# Patient Record
Sex: FEMALE | Race: WHITE | Employment: OTHER | URBAN - METROPOLITAN AREA
[De-identification: names, ages, dates, MRNs, and addresses within clinical notes are randomized per-mention and may not be internally consistent; named-entity substitution may affect disease eponyms.]

---

## 2021-11-05 ENCOUNTER — APPOINTMENT (OUTPATIENT)
Dept: GENERAL RADIOLOGY | Age: 71
DRG: 065 | End: 2021-11-05
Attending: EMERGENCY MEDICINE
Payer: MEDICARE

## 2021-11-05 ENCOUNTER — APPOINTMENT (OUTPATIENT)
Dept: CT IMAGING | Age: 71
DRG: 065 | End: 2021-11-05
Attending: EMERGENCY MEDICINE
Payer: MEDICARE

## 2021-11-05 ENCOUNTER — HOSPITAL ENCOUNTER (INPATIENT)
Age: 71
LOS: 3 days | Discharge: HOSPICE | DRG: 065 | End: 2021-11-08
Attending: EMERGENCY MEDICINE | Admitting: HOSPITALIST
Payer: MEDICARE

## 2021-11-05 DIAGNOSIS — N30.01 ACUTE CYSTITIS WITH HEMATURIA: ICD-10-CM

## 2021-11-05 DIAGNOSIS — I63.9 ACUTE STROKE DUE TO ISCHEMIA (HCC): ICD-10-CM

## 2021-11-05 DIAGNOSIS — A41.9 SEVERE SEPSIS WITH ACUTE ORGAN DYSFUNCTION (HCC): Primary | ICD-10-CM

## 2021-11-05 DIAGNOSIS — R65.20 SEVERE SEPSIS WITH ACUTE ORGAN DYSFUNCTION (HCC): Primary | ICD-10-CM

## 2021-11-05 DIAGNOSIS — N17.9 AKI (ACUTE KIDNEY INJURY) (HCC): ICD-10-CM

## 2021-11-05 PROBLEM — N39.0 UTI (URINARY TRACT INFECTION): Status: ACTIVE | Noted: 2021-11-05

## 2021-11-05 PROBLEM — R77.8 ELEVATED TROPONIN: Status: ACTIVE | Noted: 2021-11-05

## 2021-11-05 PROBLEM — E11.9 TYPE 2 DIABETES MELLITUS (HCC): Status: ACTIVE | Noted: 2021-11-05

## 2021-11-05 PROBLEM — E11.65 HYPERGLYCEMIA DUE TO DIABETES MELLITUS (HCC): Status: ACTIVE | Noted: 2021-11-05

## 2021-11-05 PROBLEM — E87.1 HYPONATREMIA: Status: ACTIVE | Noted: 2021-11-05

## 2021-11-05 LAB
ALBUMIN SERPL-MCNC: 1.9 G/DL (ref 3.4–5)
ALBUMIN/GLOB SERPL: 0.4 {RATIO} (ref 0.8–1.7)
ALP SERPL-CCNC: 158 U/L (ref 45–117)
ALT SERPL-CCNC: 142 U/L (ref 13–56)
ANION GAP BLD CALC-SCNC: 13 MMOL/L (ref 10–20)
ANION GAP SERPL CALC-SCNC: 8 MMOL/L (ref 3–18)
APPEARANCE UR: ABNORMAL
APTT PPP: 32 SEC (ref 23–36.4)
AST SERPL-CCNC: 75 U/L (ref 10–38)
BACTERIA URNS QL MICRO: ABNORMAL /HPF
BASOPHILS # BLD: 0 K/UL (ref 0–0.1)
BASOPHILS NFR BLD: 0 % (ref 0–2)
BILIRUB SERPL-MCNC: 0.3 MG/DL (ref 0.2–1)
BILIRUB UR QL: NEGATIVE
BNP SERPL-MCNC: 8655 PG/ML (ref 0–900)
BUN SERPL-MCNC: 94 MG/DL (ref 7–18)
BUN/CREAT SERPL: 30 (ref 12–20)
CA-I BLD-MCNC: 1.13 MMOL/L (ref 1.12–1.32)
CALCIUM SERPL-MCNC: 8.6 MG/DL (ref 8.5–10.1)
CHLORIDE BLD-SCNC: 102 MMOL/L (ref 98–107)
CHLORIDE SERPL-SCNC: 101 MMOL/L (ref 100–111)
CK MB CFR SERPL CALC: 1 % (ref 0–4)
CK MB SERPL-MCNC: 8.5 NG/ML (ref 5–25)
CK SERPL-CCNC: 823 U/L (ref 26–192)
CO2 BLD-SCNC: 18 MMOL/L (ref 21–32)
CO2 SERPL-SCNC: 21 MMOL/L (ref 21–32)
COLOR UR: ABNORMAL
CREAT BLD-MCNC: 2.32 MG/DL (ref 0.6–1.3)
CREAT SERPL-MCNC: 3.14 MG/DL (ref 0.6–1.3)
DIFFERENTIAL METHOD BLD: ABNORMAL
EOSINOPHIL # BLD: 0 K/UL (ref 0–0.4)
EOSINOPHIL NFR BLD: 0 % (ref 0–5)
EPITH CASTS URNS QL MICRO: ABNORMAL /LPF (ref 0–5)
ERYTHROCYTE [DISTWIDTH] IN BLOOD BY AUTOMATED COUNT: 13.5 % (ref 11.6–14.5)
GLOBULIN SER CALC-MCNC: 4.8 G/DL (ref 2–4)
GLUCOSE BLD-MCNC: 583 MG/DL (ref 65–100)
GLUCOSE SERPL-MCNC: 578 MG/DL (ref 74–99)
GLUCOSE UR STRIP.AUTO-MCNC: NEGATIVE MG/DL
HCT VFR BLD AUTO: 31.4 % (ref 35–45)
HGB BLD-MCNC: 10.2 G/DL (ref 12–16)
HGB UR QL STRIP: ABNORMAL
INR PPP: 1.3 (ref 0.8–1.2)
KETONES UR QL STRIP.AUTO: ABNORMAL MG/DL
LACTATE BLD-SCNC: 2.93 MMOL/L (ref 0.4–2)
LACTATE BLD-SCNC: 3.3 MMOL/L (ref 0.4–2)
LEUKOCYTE ESTERASE UR QL STRIP.AUTO: ABNORMAL
LYMPHOCYTES # BLD: 1.1 K/UL (ref 0.9–3.6)
LYMPHOCYTES NFR BLD: 7 % (ref 21–52)
MAGNESIUM SERPL-MCNC: 2.4 MG/DL (ref 1.6–2.6)
MCH RBC QN AUTO: 31.2 PG (ref 24–34)
MCHC RBC AUTO-ENTMCNC: 32.5 G/DL (ref 31–37)
MCV RBC AUTO: 96 FL (ref 78–100)
MONOCYTES # BLD: 1.3 K/UL (ref 0.05–1.2)
MONOCYTES NFR BLD: 8 % (ref 3–10)
NEUTS SEG # BLD: 13.4 K/UL (ref 1.8–8)
NEUTS SEG NFR BLD: 84 % (ref 40–73)
NITRITE UR QL STRIP.AUTO: NEGATIVE
PH UR STRIP: 5 [PH] (ref 5–8)
PHOSPHATE SERPL-MCNC: 4.6 MG/DL (ref 2.5–4.9)
PLATELET # BLD AUTO: 244 K/UL (ref 135–420)
PMV BLD AUTO: 12.1 FL (ref 9.2–11.8)
POTASSIUM BLD-SCNC: 5.1 MMOL/L (ref 3.5–5.1)
POTASSIUM SERPL-SCNC: 5 MMOL/L (ref 3.5–5.5)
PROT SERPL-MCNC: 6.7 G/DL (ref 6.4–8.2)
PROT UR STRIP-MCNC: 100 MG/DL
PROTHROMBIN TIME: 15.5 SEC (ref 11.5–15.2)
RBC # BLD AUTO: 3.27 M/UL (ref 4.2–5.3)
RBC #/AREA URNS HPF: ABNORMAL /HPF (ref 0–5)
SERVICE CMNT-IMP: ABNORMAL
SODIUM BLD-SCNC: 132 MMOL/L (ref 136–145)
SODIUM SERPL-SCNC: 130 MMOL/L (ref 136–145)
SP GR UR REFRACTOMETRY: 1.02 (ref 1–1.03)
SPECIMEN SITE: ABNORMAL
TROPONIN I SERPL-MCNC: 0.25 NG/ML (ref 0–0.04)
UROBILINOGEN UR QL STRIP.AUTO: 1 EU/DL (ref 0.2–1)
WBC # BLD AUTO: 15.9 K/UL (ref 4.6–13.2)
WBC URNS QL MICRO: ABNORMAL /HPF (ref 0–5)

## 2021-11-05 PROCEDURE — 80047 BASIC METABLC PNL IONIZED CA: CPT

## 2021-11-05 PROCEDURE — 83880 ASSAY OF NATRIURETIC PEPTIDE: CPT

## 2021-11-05 PROCEDURE — 99285 EMERGENCY DEPT VISIT HI MDM: CPT

## 2021-11-05 PROCEDURE — 82553 CREATINE MB FRACTION: CPT

## 2021-11-05 PROCEDURE — 74011000250 HC RX REV CODE- 250: Performed by: EMERGENCY MEDICINE

## 2021-11-05 PROCEDURE — 93005 ELECTROCARDIOGRAM TRACING: CPT

## 2021-11-05 PROCEDURE — 74176 CT ABD & PELVIS W/O CONTRAST: CPT

## 2021-11-05 PROCEDURE — 87040 BLOOD CULTURE FOR BACTERIA: CPT

## 2021-11-05 PROCEDURE — 84100 ASSAY OF PHOSPHORUS: CPT

## 2021-11-05 PROCEDURE — 83735 ASSAY OF MAGNESIUM: CPT

## 2021-11-05 PROCEDURE — 65660000000 HC RM CCU STEPDOWN

## 2021-11-05 PROCEDURE — 96361 HYDRATE IV INFUSION ADD-ON: CPT

## 2021-11-05 PROCEDURE — 74011250636 HC RX REV CODE- 250/636: Performed by: EMERGENCY MEDICINE

## 2021-11-05 PROCEDURE — 83605 ASSAY OF LACTIC ACID: CPT

## 2021-11-05 PROCEDURE — 85610 PROTHROMBIN TIME: CPT

## 2021-11-05 PROCEDURE — 85025 COMPLETE CBC W/AUTO DIFF WBC: CPT

## 2021-11-05 PROCEDURE — 71045 X-RAY EXAM CHEST 1 VIEW: CPT

## 2021-11-05 PROCEDURE — 94762 N-INVAS EAR/PLS OXIMTRY CONT: CPT

## 2021-11-05 PROCEDURE — 70450 CT HEAD/BRAIN W/O DYE: CPT

## 2021-11-05 PROCEDURE — 74011250636 HC RX REV CODE- 250/636: Performed by: HOSPITALIST

## 2021-11-05 PROCEDURE — 74011636637 HC RX REV CODE- 636/637: Performed by: HOSPITALIST

## 2021-11-05 PROCEDURE — 81001 URINALYSIS AUTO W/SCOPE: CPT

## 2021-11-05 PROCEDURE — 80053 COMPREHEN METABOLIC PANEL: CPT

## 2021-11-05 PROCEDURE — 87086 URINE CULTURE/COLONY COUNT: CPT

## 2021-11-05 PROCEDURE — 96374 THER/PROPH/DIAG INJ IV PUSH: CPT

## 2021-11-05 PROCEDURE — 85730 THROMBOPLASTIN TIME PARTIAL: CPT

## 2021-11-05 RX ORDER — ASPIRIN 81 MG/1
81 TABLET ORAL DAILY
Status: DISCONTINUED | OUTPATIENT
Start: 2021-11-06 | End: 2021-11-07

## 2021-11-05 RX ORDER — DEXTROSE 50 % IN WATER (D50W) INTRAVENOUS SYRINGE
25-50 AS NEEDED
Status: DISCONTINUED | OUTPATIENT
Start: 2021-11-05 | End: 2021-11-08 | Stop reason: HOSPADM

## 2021-11-05 RX ORDER — INSULIN LISPRO 100 [IU]/ML
INJECTION, SOLUTION INTRAVENOUS; SUBCUTANEOUS EVERY 6 HOURS
Status: DISCONTINUED | OUTPATIENT
Start: 2021-11-06 | End: 2021-11-08 | Stop reason: HOSPADM

## 2021-11-05 RX ORDER — SODIUM CHLORIDE 9 MG/ML
100 INJECTION, SOLUTION INTRAVENOUS CONTINUOUS
Status: DISCONTINUED | OUTPATIENT
Start: 2021-11-05 | End: 2021-11-08 | Stop reason: HOSPADM

## 2021-11-05 RX ORDER — INSULIN LISPRO 100 [IU]/ML
INJECTION, SOLUTION INTRAVENOUS; SUBCUTANEOUS
Status: DISCONTINUED | OUTPATIENT
Start: 2021-11-05 | End: 2021-11-05

## 2021-11-05 RX ORDER — INSULIN GLARGINE 100 [IU]/ML
0.3 INJECTION, SOLUTION SUBCUTANEOUS
Status: DISCONTINUED | OUTPATIENT
Start: 2021-11-05 | End: 2021-11-08 | Stop reason: HOSPADM

## 2021-11-05 RX ORDER — SODIUM CHLORIDE 0.9 % (FLUSH) 0.9 %
5-10 SYRINGE (ML) INJECTION AS NEEDED
Status: DISCONTINUED | OUTPATIENT
Start: 2021-11-05 | End: 2021-11-08 | Stop reason: HOSPADM

## 2021-11-05 RX ORDER — MAGNESIUM SULFATE 100 %
4 CRYSTALS MISCELLANEOUS AS NEEDED
Status: DISCONTINUED | OUTPATIENT
Start: 2021-11-05 | End: 2021-11-08 | Stop reason: HOSPADM

## 2021-11-05 RX ORDER — DEXTROSE 50 % IN WATER (D50W) INTRAVENOUS SYRINGE
50 AS NEEDED
Status: DISCONTINUED | OUTPATIENT
Start: 2021-11-05 | End: 2021-11-08 | Stop reason: SDUPTHER

## 2021-11-05 RX ORDER — MAGNESIUM SULFATE 100 %
16 CRYSTALS MISCELLANEOUS AS NEEDED
Status: DISCONTINUED | OUTPATIENT
Start: 2021-11-05 | End: 2021-11-08 | Stop reason: SDUPTHER

## 2021-11-05 RX ORDER — ATORVASTATIN CALCIUM 20 MG/1
20 TABLET, FILM COATED ORAL DAILY
Status: DISCONTINUED | OUTPATIENT
Start: 2021-11-06 | End: 2021-11-07

## 2021-11-05 RX ADMIN — SODIUM CHLORIDE 1000 ML: 900 INJECTION, SOLUTION INTRAVENOUS at 12:59

## 2021-11-05 RX ADMIN — SODIUM CHLORIDE 974 ML: 900 INJECTION, SOLUTION INTRAVENOUS at 13:00

## 2021-11-05 RX ADMIN — WATER 1 G: 1 INJECTION INTRAMUSCULAR; INTRAVENOUS; SUBCUTANEOUS at 13:20

## 2021-11-05 RX ADMIN — SODIUM CHLORIDE 100 ML/HR: 900 INJECTION, SOLUTION INTRAVENOUS at 21:19

## 2021-11-05 RX ADMIN — INSULIN GLARGINE 20 UNITS: 100 INJECTION, SOLUTION SUBCUTANEOUS at 21:17

## 2021-11-05 NOTE — PROGRESS NOTES
1618 Report from [address] TRANSFER - IN REPORT:  Verbal report received from [place] RN (name) on Maria Elena Palm  being received from ED (unit) for routine progression of care    Report consisted of patients Situation, Background, Assessment and   Recommendations(SBAR). Information from the following report(s) SBAR, Kardex, Intake/Output, MAR and Recent Results was reviewed with the receiving nurse. Opportunity for questions and clarification was provided. Assessment completed upon patients arrival to unit and care assumed. 1930 Bedside and Verbal shift change report given to Zoe Ho RN (oncoming nurse) by Tg Quinones. Yakelin Hargrove RN (offgoing nurse). Report included the following information SBAR, Kardex, Intake/Output, and MAR. Pt in bed, call light in reach.

## 2021-11-05 NOTE — ROUTINE PROCESS
[facility] Medic Code Sepsis  -   - Time of RRT: N/A  - Time of Code Sepsis: 4725  - Team:  Physician DR Manuel Bauman  Bedside Nurse SOLO JASMINE/SOLO BETH  Medic RRTM VICTORIANO  Supervisor RN PATTI Sydell Bloch  - SIRS # 1 [id number]  SIRS # 2 WBC-15.9  - Possible source of infection: UTI  - Organ dysfunction related to sepsis: LA > 2  - Labs:  (pull in Chem 7 and CBC)  - Initial Vitals: (pull in from EPIC)  - Blood Cultures collected times 2 OR collected within last 24 hours:  1ST SET @ 1241, 2ND SET @ 1257  - Lactic Acid Collection time OR within last 6 hours: 1237  - Antibiotic Start time: 1320  - Lactic Acid Result: 2.93  - Target Fluid Bolus Amount: 1,972 ml  - Time of Fluid Bolus initiated: 1259  - Reason for no target fluid bolus: N/A  - New PIV / or line: YES    -  Time of Fluid Bolus Completion: 1619  - Cardiopulmonary response after fluid completion: STABLE  - Time of Repeat Lactic Acid: 1435  - Repeat Lactic Acid Result: 3.30  - Repeat Vitals: (pull in from EPIC)  - Vasopressors Needed? NEGATIVE  - Debriefed with RN YES, NOTHING NEEDED    - Additional Notes:  - Transfer to higher level of care?  YES, PATIENT ADMITTED TO ROOM 359

## 2021-11-05 NOTE — H&P
History & Physical    Patient: Maria Elena Palm MRN: [id number]  CSN: [id number]    YOB: 1950  Age: 80 y.o. Sex: female      DOA: 11/5/2021  Primary Care Provider:  Lotus, MD Heidy      Assessment/Plan     Patient Active Problem List   Diagnosis Code    Stroke [facility]) I63.9    Type 2 diabetes mellitus ([facility] [address] E11.9    Hyperglycemia due to diabetes mellitus ([facility] [address] E11.65    YUNIOR (acute kidney injury) ([facility] [address] N17.9    UTI (urinary tract infection) N39.0    Hypothyroidism E03.9    Hyperlipidemia E78.5    HTN (hypertension) I10    Dementia (HCC) F03.90    Elevated troponin R77.8    Hyponatremia E87.1       Admit to monitored floor    CVA-  Likely episode 3 weeks ago,  Started on aspirin, statin. Neurology consulted by ER. DM with hyperglycemia-  Started on basal insulin, sliding scale insulin. Check hemoglobin A1c. Acute kidney injury-  CT scan with bilateral moderate to severe hydroureteral nephrosis with associated urothelial thickening suggesting infection. Started on IV fluids  Follow renal function. UTI-  Started on ceftriaxone  Follow urine cultures    Elevated troponin-  Trend cardiac enzymes  Cardiology consulted    Hypertension-  Blood pressure on the lower side, monitor blood pressure. Hypothyroidism-  Subclinical, check TSH. Hyperlipidemia-  Continue with statin    Hyponatremia-  Secondary to elevated blood sugars    Dementia    PT/OT    Palliative care consult      Estimated length of stay : 3 to 5 days    CC: Altered mental status       HPI:     Maria Elena Palm is a 80 y.o. female with hypertension, hyperlipidemia, hypothyroidism, dementia is brought to ER from the [address] [place] [address],[address].  Patient not able to provide any reliable history due to dementia, history obtained from patient's sister. sister reports that patient has been declining in her mental status for the past 3 weeks.   She has been lucid and responds to questions however 3 weeks ago she started having some confusion and decline in her status. She was checked for urine infection and was placed on Bactrim. Per sister her mental status declined significantly for the past couple of days. Sister reports that she has been talking to the nursing home regarding hospice. EMS was called and patient sent to ER for UTI. In ER she was noted to be tachypneic and tachycardic. Her urine with evidence of UTI, WBC at 15.9, sodium of 130, glucose 578, BUN/creatinine at 94/3.14, troponin of 0.25, NT proBNP of 8655. CT head showed patchy areas of transcortical low-attenuation involving the left frontal lobe and left basal ganglia concerning for sequela of infarct. There is suggestion of slight accompanying edema. Minor mass-effect on the anterior horn without evidence of midline shift, no intracranial hemorrhage. Past Medical History:   Diagnosis Date    Dementia ([address]     HTN (hypertension)     Hyperlipidemia     Hypothyroidism        Past Surgical History:   Procedure Laterality Date    HX FEMUR FRACTURE TX         No family history on file. Social History     Socioeconomic History    Marital status:        Prior to Admission medications    Not on File       No Known Allergies    Review of Systems  Unable to obtain reliable ROS        Physical Exam:     Physical Exam:  Visit Vitals  /61   Pulse (!) 102   Temp 97.5 °F (36.4 °C)   Resp 21   Ht 5' 3\" (1.6 m)   Wt 65.8 kg (145 lb)   SpO2 96%   BMI 25.69 kg/m²      O2 Device: None (Room air)    Temp (24hrs), Av.9 °F (36.6 °C), Min:97.5 °F (36.4 °C), Max:98.3 °F (36.8 °C)    No intake/output data recorded. 701 - 1900  In:  [I.V.:]  Out: -     General:  Awake, cooperative, no distress. Head:  Normocephalic, without obvious abnormality, atraumatic. Eyes:  Conjunctivae/corneas clear, sclera anicteric, PERRL, EOMs intact. Nose: Nares normal. No drainage or sinus tenderness.    Throat: Lips, mucosa, and tongue normal.    Neck: Supple, symmetrical, trachea midline, no adenopathy. Lungs:   Clear to auscultation bilaterally. Heart:   S1, S2, no murmur, click, rub or gallop. Abdomen: Soft, non-tender. Bowel sounds normal. No masses,  No organomegaly. Extremities: Extremities normal, atraumatic, no cyanosis or edema. Capillary refill normal.   Pulses: 2+ and symmetric all extremities. Skin: Skin color pink, turgor normal. No rashes or lesions   Neurologic: Not following commands. Squeezes hands. Labs Reviewed:    CMP:   Lab Results   Component Value Date/Time     (L) 11/05/2021 12:41 PM    K 5.0 11/05/2021 12:41 PM     11/05/2021 12:41 PM    CO2 21 11/05/2021 12:41 PM    AGAP 8 11/05/2021 12:41 PM     (HH) 11/05/2021 12:41 PM    BUN 94 (H) 11/05/2021 12:41 PM    CREA 3.14 (H) 11/05/2021 12:41 PM    GFRAA 18 (L) 11/05/2021 12:41 PM    GFRNA 15 (L) 11/05/2021 12:41 PM    CA 8.6 11/05/2021 12:41 PM    MG 2.4 11/05/2021 12:41 PM    PHOS 4.6 11/05/2021 12:41 PM    ALB 1.9 (L) 11/05/2021 12:41 PM    TP 6.7 11/05/2021 12:41 PM    GLOB 4.8 (H) 11/05/2021 12:41 PM    AGRAT 0.4 (L) 11/05/2021 12:41 PM     (H) 11/05/2021 12:41 PM     CBC:   Lab Results   Component Value Date/Time    WBC 15.9 (H) 11/05/2021 12:41 PM    HGB 10.2 (L) 11/05/2021 12:41 PM    HCT 31.4 (L) 11/05/2021 12:41 PM     11/05/2021 12:41 PM     All Cardiac Markers in the last 24 hours:   Lab Results   Component Value Date/Time     (H) 11/05/2021 12:41 PM    CKMB 8.5 (H) 11/05/2021 12:41 PM    CKND1 1.0 11/05/2021 12:41 PM    TROIQ 0.25 (H) 11/05/2021 12:41 PM         Procedures/imaging: see electronic medical records for all procedures/Xrays and details which were not copied into this note but were reviewed prior to creation of Plan    Please note that this dictation was completed with [employer], the [employer] voice recognition software.   Quite often unanticipated grammatical, syntax, homophones, and other interpretive errors are inadvertently transcribed by the computer software. Please disregard these errors. Please excuse any errors that have escaped final proofreading.         CC: Heidy Gautam MD

## 2021-11-05 NOTE — Clinical Note
Status[de-identified] INPATIENT [101] Type of Bed: Telemetry [19] Cardiac Monitoring Required?: Yes Inpatient Hospitalization Certified Necessary for the Following Reasons: 3. Patient receiving treatment that can only be provided in an inpatient setting (further clarification in H&P documentation) Admitting Diagnosis: Stroke [facility]) [[id number]] Admitting Physician: Abelardo Huynh [[id number]] Attending Physician: Abelardo Huynh [[id number]] Estimated Length of Stay: 2 Midnights Discharge Plan[de-identified] Extended Care Facility (e.g. Adult Home, Nursing Home, etc.)

## 2021-11-05 NOTE — ED PROVIDER NOTES
EMERGENCY DEPARTMENT HISTORY AND PHYSICAL EXAM    Date: 11/5/2021  Patient Name: Bradley Washington    History of Presenting Illness     Chief Complaint   Patient presents with    Respiratory Distress    Altered mental status         History Provided By: EMS    12:27 PM  Bradley Washington is a 80 y.o. female with PMHX of dementia, hypertension, arrived as DNR from nursing facility who presents to the emergency department C/O altered mental status. Per EMS they were called to the nursing facility because patient is more confused than normal.  They were told that at baseline patient is usually alert and appropriate. EMS was told patient has a UTI and has been being treated with Bactrim at the nursing facility. Patient is breathing rapidly and tachycardic on arrival here. She will occasionally answer yes and no questions but does not answer questions regularly or appropriately limiting history. Patient does not smoke, drink or use any other substances. PCP: Heidy Gautam MD    Current Facility-Administered Medications   Medication Dose Route Frequency Provider Last Rate Last Admin    sodium chloride (NS) flush 5-10 mL  5-10 mL IntraVENous PRN Piper Larsen MD           Past History       Past Medical History:  No past medical history on file. Past Surgical History:  No past surgical history on file. Family History:  No family history on file.     Social History:  Social History     Tobacco Use    Smoking status: Not on file    Smokeless tobacco: Not on file   Substance Use Topics    Alcohol use: Not on file    Drug use: Not on file       Allergies:  No Known Allergies      Review of Systems   Review of Systems   Unable to perform ROS: Mental status change         Physical Exam     Vitals:    11/05/21 1249 11/05/21 1255 11/05/21 1259 11/05/21 1329   BP:   127/86 (!) 145/88   Pulse:    98   Resp:       Temp:       SpO2:  95% 96% 97%   Weight: 65.8 kg (145 lb)      Height: 5' 3\" (1.6 m)        Physical Exam    Nursing notes and vital signs reviewed    Constitutional: Chronically ill appearing, moderate distress  Head: Normocephalic, Atraumatic  Eyes: EOMI  Neck: Supple  Cardiovascular: Tachycardic and regular rhythm, no murmurs, rubs, or gallops  Chest: Increased work of breathing and equal chest excursion bilaterally, tachypneic  Lungs: Diminished to ausculation bilaterally  Abdomen: Soft, non tender, non distended  Back: No evidence of trauma or deformity  Extremities: Lower extremities appear contracted, has wounds on heels that are dressed and dry, moderate bilateral lower extremity edema  Skin: Warm and dry, normal cap refill  Neuro: Alert and occasionally answers yes or no questions, face symmetric, decreased strength in all 4 extremities  Psychiatric: Anxious mood and affect      Diagnostic Study Results     Labs -     Recent Results (from the past 12 hour(s))   CHEM8,LACTIC ACID,POC    Collection Time: 11/05/21 12:37 PM   Result Value Ref Range    Calcium, ionized (POC) 1.13 1.12 - 1.32 mmol/L    Sodium (POC) 132 (L) 136 - 145 mmol/L    Potassium (POC) 5.1 3.5 - 5.1 mmol/L    Chloride (POC) 102 98 - 107 mmol/L    CO2 (POC) 18.0 (L) 21 - 32 mmol/L    Glucose (POC) 583 (H) 65 - 100 mg/dL    Creatinine (POC) 2.32 (H) 0.6 - 1.3 mg/dL    GFRAA, POC 25 (L) >60 ml/min/1.73m2    GFRNA, POC 21 (L) >60 ml/min/1.73m2    Lactic Acid (POC) 2.93 (HH) 0.40 - 2.00 mmol/L    Sample source VENOUS BLOOD      Performed by Bartolo Cortez     Anion gap (POC) 13 10 - 20 mmol/L   METABOLIC PANEL, COMPREHENSIVE    Collection Time: 11/05/21 12:41 PM   Result Value Ref Range    Sodium 130 (L) 136 - 145 mmol/L    Potassium 5.0 3.5 - 5.5 mmol/L    Chloride 101 100 - 111 mmol/L    CO2 21 21 - 32 mmol/L    Anion gap 8 3.0 - 18 mmol/L    Glucose 578 (HH) 74 - 99 mg/dL    BUN 94 (H) 7.0 - 18 MG/DL    Creatinine 3.14 (H) 0.6 - 1.3 MG/DL    BUN/Creatinine ratio 30 (H) 12 - 20      GFR est AA 18 (L) >60 ml/min/1.73m2    GFR est non-AA 15 (L) >60 ml/min/1.73m2    Calcium 8.6 8.5 - 10.1 MG/DL    Bilirubin, total 0.3 0.2 - 1.0 MG/DL    ALT (SGPT) 142 (H) 13 - 56 U/L    AST (SGOT) 75 (H) 10 - 38 U/L    Alk. phosphatase 158 (H) 45 - 117 U/L    Protein, total 6.7 6.4 - 8.2 g/dL    Albumin 1.9 (L) 3.4 - 5.0 g/dL    Globulin 4.8 (H) 2.0 - 4.0 g/dL    A-G Ratio 0.4 (L) 0.8 - 1.7     CBC WITH AUTOMATED DIFF    Collection Time: 11/05/21 12:41 PM   Result Value Ref Range    WBC 15.9 (H) 4.6 - 13.2 K/uL    RBC 3.27 (L) 4.20 - 5.30 M/uL    HGB 10.2 (L) 12.0 - 16.0 g/dL    HCT 31.4 (L) 35.0 - 45.0 %    MCV 96.0 78.0 - 100.0 FL    MCH 31.2 24.0 - 34.0 PG    MCHC 32.5 31.0 - 37.0 g/dL    RDW 13.5 11.6 - 14.5 %    PLATELET 239 207 - 739 K/uL    MPV 12.1 (H) 9.2 - 11.8 FL    NEUTROPHILS 84 (H) 40 - 73 %    LYMPHOCYTES 7 (L) 21 - 52 %    MONOCYTES 8 3 - 10 %    EOSINOPHILS 0 0 - 5 %    BASOPHILS 0 0 - 2 %    ABS. NEUTROPHILS 13.4 (H) 1.8 - 8.0 K/UL    ABS. LYMPHOCYTES 1.1 0.9 - 3.6 K/UL    ABS. MONOCYTES 1.3 (H) 0.05 - 1.2 K/UL    ABS. EOSINOPHILS 0.0 0.0 - 0.4 K/UL    ABS.  BASOPHILS 0.0 0.0 - 0.1 K/UL    DF AUTOMATED     MAGNESIUM    Collection Time: 11/05/21 12:41 PM   Result Value Ref Range    Magnesium 2.4 1.6 - 2.6 mg/dL   PHOSPHORUS    Collection Time: 11/05/21 12:41 PM   Result Value Ref Range    Phosphorus 4.6 2.5 - 4.9 MG/DL   PROTHROMBIN TIME + INR    Collection Time: 11/05/21 12:41 PM   Result Value Ref Range    Prothrombin time 15.5 (H) 11.5 - 15.2 sec    INR 1.3 (H) 0.8 - 1.2     PTT    Collection Time: 11/05/21 12:41 PM   Result Value Ref Range    aPTT 32.0 23.0 - 36.4 SEC   CARDIAC PANEL,(CK, CKMB & TROPONIN)    Collection Time: 11/05/21 12:41 PM   Result Value Ref Range    CK - MB 8.5 (H) <3.6 ng/ml    CK-MB Index 1.0 0.0 - 4.0 %     (H) 26 - 192 U/L    Troponin-I, QT 0.25 (H) 0.0 - 0.045 NG/ML   NT-PRO BNP    Collection Time: 11/05/21 12:41 PM   Result Value Ref Range    NT pro-BNP 8,655 (H) 0 - 900 PG/ML   URINALYSIS W/ RFLX MICROSCOPIC Collection Time: 11/05/21  1:18 PM   Result Value Ref Range    Color DARK YELLOW      Appearance TURBID      Specific gravity 1.018 1.005 - 1.030      pH (UA) 5.0 5.0 - 8.0      Protein 100 (A) NEG mg/dL    Glucose Negative NEG mg/dL    Ketone TRACE (A) NEG mg/dL    Bilirubin Negative NEG      Blood LARGE (A) NEG      Urobilinogen 1.0 0.2 - 1.0 EU/dL    Nitrites Negative NEG      Leukocyte Esterase LARGE (A) NEG     URINE MICROSCOPIC ONLY    Collection Time: 11/05/21  1:18 PM   Result Value Ref Range    WBC TOO NUMEROUS TO COUNT 0 - 5 /hpf    RBC TOO NUMEROUS TO COUNT 0 - 5 /hpf    Epithelial cells 2+ 0 - 5 /lpf    Bacteria 4+ (A) NEG /hpf   EKG, 12 LEAD, INITIAL    Collection Time: 11/05/21  2:16 PM   Result Value Ref Range    Ventricular Rate 107 BPM    Atrial Rate 107 BPM    P-R Interval 178 ms    QRS Duration 104 ms    Q-T Interval 354 ms    QTC Calculation (Bezet) 472 ms    Calculated P Axis 58 degrees    Calculated R Axis 14 degrees    Calculated T Axis 180 degrees    Diagnosis       Sinus tachycardia with premature atrial complexes  ST & T wave abnormality, consider inferolateral ischemia  Abnormal ECG  No previous ECGs available     POC LACTIC ACID    Collection Time: 11/05/21  2:35 PM   Result Value Ref Range    Lactic Acid (POC) 3.30 (HH) 0.40 - 2.00 mmol/L       Radiologic Studies -   CT ABD PELV WO CONT   Final Result      Thick-walled urinary bladder with associated stranding suggesting cystitis. Gas   within the urinary bladder likely from recent manipulation. Bilateral moderate to severe hydroureteronephrosis with associated urothelial   thickening suggesting superimposed infection. CT HEAD WO CONT   Final Result         1. Patchy areas of transcortical low-attenuation involving the left frontal lobe   and left basal ganglia as described concerning for sequela of infarct.  Although   age indeterminate in CT appearance, there is suggestion of slight accompanying   edema which would suggest a more recent infarction.      > No intra-axial or extra-axial hemorrhage.      > Minor mass effect on the anterior horn left lateral ventricle without   evidence of midline shift. Subcortical and periventricular white matter low-attenuation, as can be seen   with chronic ischemic microvascular change. Case and findings discussed with Dr. Chandra Dukes in the emergency room prior to   dictation at 1440 hours, 11/5/2021      XR CHEST PORT   Final Result      Mildly underexpanded lungs without superimposed acute radiographic abnormality. CT Results  (Last 48 hours)               11/05/21 1433  CT ABD PELV WO CONT Final result    Impression: Thick-walled urinary bladder with associated stranding suggesting cystitis. Gas   within the urinary bladder likely from recent manipulation. Bilateral moderate to severe hydroureteronephrosis with associated urothelial   thickening suggesting superimposed infection. Narrative:  EXAM: CT of the abdomen and pelvis       INDICATION: Pain. COMPARISON: None. TECHNIQUE: Axial CT imaging of the abdomen and pelvis was performed with   intravenous contrast. Multiplanar reformats were generated. One or more dose reduction techniques were used on this CT: automated exposure   control, adjustment of the mAs and/or kVp according to patient size, and   iterative reconstruction techniques. The specific techniques used on this CT   exam have been documented in the patient's electronic medical record. Digital   Imaging and Communications in Medicine (DICOM) format image data are available   to nonaffiliated external healthcare facilities or entities on a secure, media   free, reciprocally searchable basis with patient authorization for at least a   12-month period after this study. _______________       FINDINGS:       LOWER CHEST: Unremarkable. LIVER, BILIARY: Liver is normal. No biliary dilation. Gallbladder is   unremarkable. PANCREAS: Normal.       SPLEEN: Normal.       ADRENALS: Normal.       KIDNEYS/URETERS/BLADDER: Moderate to severe bilateral hydroureteronephrosis with   bilateral urothelial thickening. Distended urinary bladder with multiple   diverticula, likely sequela of chronic outlet obstruction. Associated mural   thickening is concerning for superimposed infection. Gas within the bladder   likely from recent manipulation. PELVIC ORGANS: Unremarkable. VASCULATURE: Scattered vascular calcifications. LYMPH NODES: No enlarged lymph nodes. GASTROINTESTINAL TRACT: No bowel dilation or wall thickening. Rectal fecal   impaction. BONES: No acute or aggressive osseous abnormalities identified. T12 anterior   wedging deformity. Prior right hip ORIF. OTHER: None.       _______________           11/05/21 1421  CT HEAD WO CONT Final result    Impression:          1. Patchy areas of transcortical low-attenuation involving the left frontal lobe   and left basal ganglia as described concerning for sequela of infarct. Although   age indeterminate in CT appearance, there is suggestion of slight accompanying   edema which would suggest a more recent infarction.      > No intra-axial or extra-axial hemorrhage.      > Minor mass effect on the anterior horn left lateral ventricle without   evidence of midline shift. Subcortical and periventricular white matter low-attenuation, as can be seen   with chronic ischemic microvascular change. Case and findings discussed with Dr. Kyler Martin in the emergency room prior to   dictation at 1440 hours, 11/5/2021       Narrative:  EXAM: CT head       INDICATION: Altered mental status       COMPARISON: None. TECHNIQUE: Axial CT imaging of the head was performed without intravenous   contrast. Standard multiplanar coronal and sagittal reformatted images were   obtained and are included in interpretation.        One or more dose reduction techniques were used on this CT: automated exposure   control, adjustment of the mAs and/or kVp according to patient size, and   iterative reconstruction techniques. The specific techniques used on this CT   exam have been documented in the patient's electronic medical record. Digital   Imaging and Communications in Medicine (DICOM) format image data are available   to nonaffiliated external healthcare facilities or entities on a secure, media   free, reciprocally searchable basis with patient authorization for at least a   12-month period after this study. _______________       FINDINGS:       BRAIN AND POSTERIOR FOSSA: There is patchy transcortical edema which extends   throughout the majority of the anterior inferior left frontal lobe, left basal   ganglia, left caudate, and paramedian aspects of the anterior left frontal lobe. There is suggestion of mild mass effect upon the anterior horn of the left   lateral ventricle. No intra-axial hemorrhage. No evidence of midline shift. Subcortical and   periventricular white matter low-attenuation noted, mild in nature. EXTRA-AXIAL SPACES AND MENINGES: No extra-axial fluid collection. CALVARIUM: Intact. SINUSES: Clear. OTHER: None.       _______________               CXR Results  (Last 48 hours)               11/05/21 1247  XR CHEST PORT Final result    Impression:      Mildly underexpanded lungs without superimposed acute radiographic abnormality. Narrative:  EXAM: XR CHEST PORT       CLINICAL INDICATION/HISTORY: meets SIRS criteria   -Additional: Shortness of breath, respiratory distress with altered mental   status       COMPARISON: None       TECHNIQUE: Frontal view of the chest       _______________       FINDINGS:       HEART AND MEDIASTINUM: Normal cardiac size and mediastinal contours. LUNGS AND PLEURAL SPACES: Lungs are mildly underexpanded. There is no focal   pneumonic consolidation. No evidence of pneumothorax or pleural effusion. BONY THORAX AND SOFT TISSUES: No acute osseous abnormality. Superposition of   bowel loops over the upper abdomen noted. _______________                 Medications given in the ED-  Medications   sodium chloride (NS) flush 5-10 mL (has no administration in time range)   cefTRIAXone (ROCEPHIN) 1 g in sterile water (preservative free) 10 mL IV syringe (1 g IntraVENous Given 11/5/21 1320)   sodium chloride 0.9 % bolus infusion 1,000 mL (1,000 mL IntraVENous New Bag 11/5/21 1259)     Followed by   sodium chloride 0.9 % bolus infusion 974 mL (974 mL IntraVENous New Bag 11/5/21 1300)         Medical Decision Making   I am the first provider for this patient. I reviewed the vital signs, available nursing notes, past medical history, past surgical history, family history and social history. Vital Signs-Reviewed the patient's vital signs. Pulse Oximetry Analysis - 95% on room air, borderline    Cardiac Monitor:  Rate: 106 bpm  Rhythm: Sinus tachycardia    EKG interpretation: (Preliminary)  EKG read by Dr. Staci Davidson at 2:23 PM  sinus tachycardia rate 107 bpm, DE interval 178 ms, QRS duration 104 ms, no prior available for comparison    Records Reviewed: Nursing Notes and Old Medical Records    Provider Notes (Medical Decision Making): Kyler Mortensen is a 80 y.o. female presenting for altered mental status and respiratory distress. Patient has dementia and lives in a nursing facility. She is currently being treated for a UTI with Bactrim. On arrival here patient was tachypneic but satting well on room air. Sepsis protocol was initiated and coverage given for suspected UTI. UA confirms UTI. Blood and urine cultures are pending. In addition lab work reveals acute kidney injury possibly secondary to treatment with Bactrim for her UTI. Head CT was obtained which shows large likely acute infarct.   Discussed all these findings extensively with patient and her sister who is at bedside and her decision-maker. Sister confirmed that patient is a DNR/DNI and stated they would like to move towards more palliative care and comfort focused measures. Consult placed to palliative care. Discussed with hospitalist and neurology for further in-hospital evaluation and management. Patient and her sister understand and agree with this plan. Procedures:  Procedures    ED Course:   CONSULT NOTE:   2:13 PM  Dr. Ross Haas spoke with Dr. Hernán Holliday   Specialty: [facility] Edu[facility] Michelle  Discussed pt's hx, disposition, and available diagnostic and imaging results over the telephone. Reviewed care plans. Will consult on the patient. 2:40 PM  Call from radiologist that patient has very large acute stroke on head CT.    2:40 PM  Spoke with patient's sister who is at bedside and her decision-maker. Both patient and sister want to focus primarily on comfort measures. Are not ready to make fully comfort care but would like to speak to palliative care about options for end-of-life comfort focused care. CONSULT NOTE:   2:52 PM  Dr. Ross Haas spoke with Dr. Herman Gonzales   Specialty: Neurology  Discussed pt's hx, disposition, and available diagnostic and imaging results over the telephone. Reviewed care plans. Will consult on the patient. CONSULT NOTE:   3:06 PM  Dr. Ross Haas spoke with Dr. SHON MENSAH [facility]   Specialty: Hospitalist  Discussed pt's hx, disposition, and available diagnostic and imaging results over the telephone. Reviewed care plans. Accepts to telemetry. Diagnosis and Disposition     Critical Care Time: 3:30 PM  I have spent 40 minutes of critical care time involved in lab review, consultations with specialist, family decision-making, and documentation. During this entire length of time I was immediately available to the patient. Critical Care:   The reason for providing this level of medical care for this critically ill patient was due a critical illness that impaired one or more vital organ systems such that there was a high probability of imminent or life threatening deterioration in the patients condition. This care involved high complexity decision making to assess, manipulate, and support vital system functions, to treat this degreee vital organ system failure and to prevent further life threatening deterioration of the patients condition. Core Measures:  For Hospitalized Patients:    1. Hospitalization Decision Time:  The decision to hospitalize the patient was made by Dr. Urbano Mcfarlane at 2:30 PM on 11/5/2021    2:13 PM  Patient is being admitted to the hospital by Dr. Vipin Gardiner. The results of their tests and reasons for their admission have been discussed with them and/or available family. They convey agreement and understanding for the need to be admitted and for their admission diagnosis. CONDITIONS ON ADMISSION:  Sepsis is present at the time of admission. Deep Vein Thrombosis is not present at the time of admission. Thrombosis is not present at the time of admission. Urinary Tract Infection is present at the time of admission. Pneumonia is not present at the time of admission. MRSA maybe present at the time of admission. Wound infection maybe present at the time of admission. Pressure Ulcer is present at the time of admission. CLINICAL IMPRESSION:    1. Severe sepsis with acute organ dysfunction ([facility] [address]    2. YUNIOR (acute kidney injury) ([facility] [address]    3. Acute cystitis with hematuria    4. Acute stroke due to ischemia [facility])      _______________________________      Please note that this dictation was completed with [employer], the computer voice recognition software. Quite often unanticipated grammatical, syntax, homophones, and other interpretive errors are inadvertently transcribed by the computer software. Please disregard these errors. Please excuse any errors that have escaped final proofreading.

## 2021-11-06 PROBLEM — E11.29 TYPE II OR UNSPECIFIED TYPE DIABETES MELLITUS WITH RENAL MANIFESTATIONS, UNCONTROLLED(250.42) (HCC): Status: ACTIVE | Noted: 2021-11-05

## 2021-11-06 PROBLEM — E11.65 TYPE II OR UNSPECIFIED TYPE DIABETES MELLITUS WITH RENAL MANIFESTATIONS, UNCONTROLLED(250.42) (HCC): Status: ACTIVE | Noted: 2021-11-05

## 2021-11-06 LAB
ALBUMIN SERPL-MCNC: 2 G/DL (ref 3.4–5)
ALBUMIN/GLOB SERPL: 0.4 {RATIO} (ref 0.8–1.7)
ALP SERPL-CCNC: 146 U/L (ref 45–117)
ALT SERPL-CCNC: 130 U/L (ref 13–56)
ANION GAP SERPL CALC-SCNC: 8 MMOL/L (ref 3–18)
AST SERPL-CCNC: 88 U/L (ref 10–38)
ATRIAL RATE: 107 BPM
BACTERIA SPEC CULT: NORMAL
BASOPHILS # BLD: 0 K/UL (ref 0–0.1)
BASOPHILS NFR BLD: 0 % (ref 0–2)
BILIRUB SERPL-MCNC: 0.2 MG/DL (ref 0.2–1)
BUN SERPL-MCNC: 82 MG/DL (ref 7–18)
BUN/CREAT SERPL: 32 (ref 12–20)
CALCIUM SERPL-MCNC: 8.6 MG/DL (ref 8.5–10.1)
CALCULATED P AXIS, ECG09: 58 DEGREES
CALCULATED R AXIS, ECG10: 14 DEGREES
CALCULATED T AXIS, ECG11: 180 DEGREES
CHLORIDE SERPL-SCNC: 111 MMOL/L (ref 100–111)
CK MB CFR SERPL CALC: 0.8 % (ref 0–4)
CK MB CFR SERPL CALC: 0.9 % (ref 0–4)
CK MB SERPL-MCNC: 10.2 NG/ML (ref 5–25)
CK MB SERPL-MCNC: 11.4 NG/ML (ref 5–25)
CK SERPL-CCNC: 1151 U/L (ref 26–192)
CK SERPL-CCNC: 1441 U/L (ref 26–192)
CO2 SERPL-SCNC: 19 MMOL/L (ref 21–32)
CREAT SERPL-MCNC: 2.57 MG/DL (ref 0.6–1.3)
DIAGNOSIS, 93000: NORMAL
DIFFERENTIAL METHOD BLD: ABNORMAL
EOSINOPHIL # BLD: 0 K/UL (ref 0–0.4)
EOSINOPHIL NFR BLD: 0 % (ref 0–5)
ERYTHROCYTE [DISTWIDTH] IN BLOOD BY AUTOMATED COUNT: 13.9 % (ref 11.6–14.5)
EST. AVERAGE GLUCOSE BLD GHB EST-MCNC: 180 MG/DL
GLOBULIN SER CALC-MCNC: 4.6 G/DL (ref 2–4)
GLUCOSE BLD STRIP.AUTO-MCNC: 148 MG/DL (ref 70–110)
GLUCOSE BLD STRIP.AUTO-MCNC: 163 MG/DL (ref 70–110)
GLUCOSE BLD STRIP.AUTO-MCNC: 214 MG/DL (ref 70–110)
GLUCOSE SERPL-MCNC: 372 MG/DL (ref 74–99)
HBA1C MFR BLD: 7.9 % (ref 4.2–5.6)
HCT VFR BLD AUTO: 30.1 % (ref 35–45)
HGB BLD-MCNC: 9.5 G/DL (ref 12–16)
LYMPHOCYTES # BLD: 1.2 K/UL (ref 0.9–3.6)
LYMPHOCYTES NFR BLD: 8 % (ref 21–52)
MCH RBC QN AUTO: 30.6 PG (ref 24–34)
MCHC RBC AUTO-ENTMCNC: 31.6 G/DL (ref 31–37)
MCV RBC AUTO: 97.1 FL (ref 78–100)
MONOCYTES # BLD: 1.6 K/UL (ref 0.05–1.2)
MONOCYTES NFR BLD: 10 % (ref 3–10)
NEUTS SEG # BLD: 12.8 K/UL (ref 1.8–8)
NEUTS SEG NFR BLD: 82 % (ref 40–73)
P-R INTERVAL, ECG05: 178 MS
PLATELET # BLD AUTO: 225 K/UL (ref 135–420)
PLATELET COMMENTS,PCOM: ABNORMAL
PMV BLD AUTO: 12.3 FL (ref 9.2–11.8)
POTASSIUM SERPL-SCNC: 4.5 MMOL/L (ref 3.5–5.5)
PROT SERPL-MCNC: 6.6 G/DL (ref 6.4–8.2)
Q-T INTERVAL, ECG07: 354 MS
QRS DURATION, ECG06: 104 MS
QTC CALCULATION (BEZET), ECG08: 472 MS
RBC # BLD AUTO: 3.1 M/UL (ref 4.2–5.3)
RBC MORPH BLD: ABNORMAL
SERVICE CMNT-IMP: NORMAL
SODIUM SERPL-SCNC: 138 MMOL/L (ref 136–145)
TROPONIN I SERPL-MCNC: 0.26 NG/ML (ref 0–0.04)
TROPONIN I SERPL-MCNC: 0.26 NG/ML (ref 0–0.04)
TSH SERPL DL<=0.05 MIU/L-ACNC: 1.35 UIU/ML (ref 0.36–3.74)
VENTRICULAR RATE, ECG03: 107 BPM
WBC # BLD AUTO: 15.6 K/UL (ref 4.6–13.2)

## 2021-11-06 PROCEDURE — 36415 COLL VENOUS BLD VENIPUNCTURE: CPT

## 2021-11-06 PROCEDURE — 74011250637 HC RX REV CODE- 250/637: Performed by: HOSPITALIST

## 2021-11-06 PROCEDURE — 74011636637 HC RX REV CODE- 636/637: Performed by: HOSPITALIST

## 2021-11-06 PROCEDURE — 82962 GLUCOSE BLOOD TEST: CPT

## 2021-11-06 PROCEDURE — 82553 CREATINE MB FRACTION: CPT

## 2021-11-06 PROCEDURE — 85025 COMPLETE CBC W/AUTO DIFF WBC: CPT

## 2021-11-06 PROCEDURE — 84443 ASSAY THYROID STIM HORMONE: CPT

## 2021-11-06 PROCEDURE — 65660000000 HC RM CCU STEPDOWN

## 2021-11-06 PROCEDURE — 74011000250 HC RX REV CODE- 250: Performed by: HOSPITALIST

## 2021-11-06 PROCEDURE — 74011250636 HC RX REV CODE- 250/636: Performed by: HOSPITALIST

## 2021-11-06 PROCEDURE — 80053 COMPREHEN METABOLIC PANEL: CPT

## 2021-11-06 PROCEDURE — 83036 HEMOGLOBIN GLYCOSYLATED A1C: CPT

## 2021-11-06 RX ADMIN — ASPIRIN 81 MG: 81 TABLET, COATED ORAL at 09:05

## 2021-11-06 RX ADMIN — ATORVASTATIN CALCIUM 20 MG: 20 TABLET, FILM COATED ORAL at 09:05

## 2021-11-06 RX ADMIN — INSULIN LISPRO 2 UNITS: 100 INJECTION, SOLUTION INTRAVENOUS; SUBCUTANEOUS at 13:32

## 2021-11-06 RX ADMIN — SODIUM CHLORIDE 100 ML/HR: 900 INJECTION, SOLUTION INTRAVENOUS at 08:56

## 2021-11-06 RX ADMIN — INSULIN LISPRO 4 UNITS: 100 INJECTION, SOLUTION INTRAVENOUS; SUBCUTANEOUS at 08:56

## 2021-11-06 RX ADMIN — WATER 1 G: 1 INJECTION INTRAMUSCULAR; INTRAVENOUS; SUBCUTANEOUS at 13:31

## 2021-11-06 RX ADMIN — INSULIN LISPRO 10 UNITS: 100 INJECTION, SOLUTION INTRAVENOUS; SUBCUTANEOUS at 01:00

## 2021-11-06 RX ADMIN — INSULIN GLARGINE 20 UNITS: 100 INJECTION, SOLUTION SUBCUTANEOUS at 23:16

## 2021-11-06 RX ADMIN — SODIUM CHLORIDE 100 ML/HR: 900 INJECTION, SOLUTION INTRAVENOUS at 18:15

## 2021-11-06 NOTE — PROGRESS NOTES
Hospitalist Progress Note    Patient: Darlene Mccray MRN: [id number]  [facility]: [id number]    YOB: 1950  Age: 80 y.o. Sex: female    DOA: 11/5/2021 LOS:  LOS: 1 day            Assessment/Plan     Principal Problem:    Stroke [facility]) (11/5/2021)    Active Problems:    Type II or unspecified type diabetes mellitus with renal manifestations, uncontrolled(250.42) ([facility] [address] (11/5/2021)      Hyperglycemia due to diabetes mellitus ([facility] (11/5/2021)      YUNIOR (acute kidney injury) ([facility] (11/5/2021)      UTI (urinary tract infection) (11/5/2021)      Hypothyroidism ()      Hyperlipidemia ()      HTN (hypertension) ()      Dementia (HCC) ()      Elevated troponin (11/5/2021)      Hyponatremia (11/5/2021)      CVA: Likely episode 3 weeks ago,  Started on aspirin, statin. Discussed the case with Dr. Christi Dodd regarding the treatment plan. We both talked to the pt's POA/sister about the pt's dx and treatment plan. The pt's POA insisted to place the pt with comfort care only.     DM with hyperglycemia: on basal insulin, sliding scale insulin. Check hemoglobin A1c.     Acute kidney injury: CT scan with bilateral moderate to severe hydroureteral nephrosis with associated urothelial thickening suggesting infection and bladder retention. Her POA refused the ricardo cath. Started on IV fluids  Follow renal function.     UTI: on ceftriaxone  Follow urine cultures     Elevated troponin: Trend cardiac enzymes  Cardiology consulted     Hypertension: Blood pressure on the lower side, monitor blood pressure.     Hypothyroidism: Subclinical, check TSH.     Hyperlipidemia: Continue with statin     Hyponatremia: Secondary to elevated blood sugars     Dementia     DNR/DNI     Family decided to make the patient comfort care only which will include morphine, ativan, scopolamin,tylenol,zofran. Faimly does not (NOT) want any further advanced treatments such as ricardo cath, feeding tubes, chemo, radiation,bipap.  Family does not want any attempts to artificially prolong life. Family DOES want every effort made to promote comfort. Discussed with them, will start comfort care per [facility] comfort care protocol. I fully concur their wishes. Long discussion with Pt's POA/sisiter in the presence of patient re medicines, hospital course. We reviewed and discussed assessment of condition and went over plans of care. Questions answered. Total time 45 min's, including more than 50% on discussion with family and coordinating care. Discussed the case with the . Arrange comfort care at SNF. Estimated length of stay : 2-3 days     CC: Altered mental status            Subjective:     Pt was seen and examined with the nurse in the morning round. Pt is alert, oriented only to herself. + lethargic . Her POA/sister and sister in law were at the room. Review of systems  Unobtainable. Objective:      Visit Vitals  BP (!) 114/37   Pulse 94   Temp 98.3 °F (36.8 °C)   Resp 22   Ht 5' 3\" (1.6 m)   Wt 65.8 kg (145 lb 1 oz)   SpO2 99%   BMI 25.70 kg/m²       Physical Exam:    Gen: NAD, frail. Ill appearing   Heent:  MMM, NC, AT. Cor: s1s2 RRR. No MRG. PMI mid 5th intercostal space. Resp:  CTA b/l. No w/r/r. Nml effort and diaphragmatic excursion. Abd:  NT ND.  BS positive. No rebound or guarding. No masses. Ext: contracted both legs. Screams with pain when legs are moved. Intake and Output:  Current Shift:  No intake/output data recorded.   Last three shifts:  11/04 1901 - 11/06 0700  In: 2000 [I.V.:2000]  Out: 0     Labs: Results:       Chemistry Recent Labs     11/06/21  0215 11/05/21  1241   * 578*    130*   K 4.5 5.0    101   CO2 19* 21   BUN 82* 94*   CREA 2.57* 3.14*   CA 8.6 8.6   AGAP 8 8   BUCR 32* 30*   * 158*   TP 6.6 6.7   ALB 2.0* 1.9*   GLOB 4.6* 4.8*   AGRAT 0.4* 0.4*      CBC w/Diff Recent Labs     11/06/21  0215 11/05/21  1241   WBC 15.6* 15.9*   RBC 3.10* 3.27*   HGB 9.5* 10.2*   HCT 30.1* 31.4*    244   GRANS 82* 84*   LYMPH 8* 7*   EOS 0 0      Cardiac Enzymes Recent Labs     11/06/21  0840 11/06/21  0215   CPK 1,151* 1,441*   CKND1 0.9 0.8      Coagulation Recent Labs     11/05/21  1241   PTP 15.5*   INR 1.3*   APTT 32.0       Lipid Panel No results found for: CHOL, CHOLPOCT, CHOLX, CHLST, CHOLV, 578934, HDL, HDLP, LDL, LDLC, DLDLP, 456346, VLDLC, VLDL, TGLX, TRIGL, TRIGP, TGLPOCT, CHHD, CHHDX   BNP No results for input(s): BNPP in the last 72 hours.    Liver Enzymes Recent Labs     11/06/21 0215   TP 6.6   ALB 2.0*   *      Thyroid Studies Lab Results   Component Value Date/Time    TSH 1.35 11/06/2021 02:15 AM        Procedures/imaging: see electronic medical records for all procedures/Xrays and details which were not copied into this note but were reviewed prior to creation of Plan      Medications Reviewed  Kb Abraham MD

## 2021-11-06 NOTE — PROGRESS NOTES
Reason for Admission:   Chart reviewed; per H&P, patient is a \"79 y.o. female with hypertension, hyperlipidemia, hypothyroidism, dementia is brought to ER from the [address] [place] [address],[address].  Patient not able to provide any reliable history due to dementia, history obtained from patient's sister. sister reports that patient has been declining in her mental status for the past 3 weeks. She has been lucid and responds to questions however 3 weeks ago she started having some confusion and decline in her status. She was checked for urine infection and was placed on Bactrim. Per sister her mental status declined significantly for the past couple of days. Sister reports that she has been talking to the nursing home regarding hospice. EMS was called and patient sent to ER for UTI. \"                    RUR Score: Moderate; 15%             PCP: First and Last name:   Other, MD Heidy     Name of Practice:    Are you a current patient: Yes/No:    Approximate date of last visit:    Can you participate in a virtual visit if needed:     Do you (patient/family) have any concerns for transition/discharge? Plan for utilizing home health:   TBD    Current Advanced Directive/Advance Care Plan:  DNR      Healthcare Decision Maker:   Click here to complete [address] including selection of the Healthcare Decision Maker Relationship (ie \"Primary\")    Too Ocampo, sister  - Primary - [phone]              Transition of Care Plan:     Care manager met with patient who was not speaking and sister Dawit Hartman who was very articulate that she has advanced directive and that patient would want to return to the [facility] on comfort care; her siblings include Monica Stuart who lives in [place] and another sister Hunter Casarez who lives in [place]. Per Dawit Hartman, she wants to refuse ricardo, nutritional support and would like for patient to return to [facility].   CM has sent info to admissions coordinator with request to return on comfort care. Noted palliative care consult which should be completed some time on Monday.       Care Management Interventions  Transition of Care Consult (CM Consult): [address] [facility], Discharge Planning  Support Systems: Child(reynaldo), Pablo  Confirm Follow Up Transport:  (stretcher transport)  The Plan for Transition of Care is Related to the Following Treatment Goals : stroe  Freedom of Choice List was Provided with Basic Dialogue that Supports the Patient's Individualized Plan of Care/Goals, Treatment Preferences and Shares the Quality Data Associated with the Providers?: Yes  Discharge Location  Discharge Placement: [address] [place] [address]

## 2021-11-06 NOTE — ROUTINE PROCESS
Bedside and Verbal shift change report given to Simone Kendrick RN (oncoming nurse) by Milan Linn RN (offgoing nurse). Report included the following information SBAR and Kardex.

## 2021-11-06 NOTE — CONSULTS
NEUROLOGY CONSULTATION NOTE    Patient: Eri Hagan MRN: [id number]  CSN: [id number]    YOB: 1950  Age: 80 y.o. Sex: female    DOA: 11/5/2021 LOS:  LOS: 1 day        Requesting Physician: Dr. Pawel Alcocer  Reason for Consultation: Stroke and AMS               HISTORY OF PRESENT ILLNESS:   Eri Hagan is a 80 y.o. female with history of HTN, HLD, DM, dementia and CKD, who presented with declining mental status. Her sister Tank Soliz provides the history. Tank Soliz says the patient did not want heroic measures such as catheters and IV lines. Patient is confused and can not provide history. When she presented to ER, her UA showed UTI, CMP showed postrenal failure. Imaging showed urinary retention and bilateral hydronephrosis. Head CT showed a left frontal an left BG infarct. PAST MEDICAL HISTORY:  Past Medical History:   Diagnosis Date    Dementia ([address]     HTN (hypertension)     Hyperlipidemia     Hypothyroidism      PAST SURGICAL HISTORY:  Past Surgical History:   Procedure Laterality Date    HX FEMUR FRACTURE TX       FAMILY HISTORY:  No family history on file.   SOCIAL HISTORY:  Social History     Socioeconomic History    Marital status:      MEDICATIONS:  Current Facility-Administered Medications   Medication Dose Route Frequency    sodium chloride (NS) flush 5-10 mL  5-10 mL IntraVENous PRN    0.9% sodium chloride infusion  100 mL/hr IntraVENous CONTINUOUS    insulin glargine (LANTUS) injection 20 Units  0.3 Units/kg SubCUTAneous QHS    glucose chewable tablet 16 g  4 Tablet Oral PRN    glucagon (GLUCAGEN) injection 1 mg  1 mg IntraMUSCular PRN    dextrose (D50W) injection syrg 12.5-25 g  25-50 mL IntraVENous PRN    glucose chewable tablet 16 g  16 g Oral PRN    glucagon (GLUCAGEN) injection 1 mg  1 mg IntraMUSCular PRN    dextrose (D50W) injection syrg 25 g  50 mL IntraVENous PRN    cefTRIAXone (ROCEPHIN) 1 g in sterile water (preservative free) 10 mL IV syringe  1 g IntraVENous Q24H    aspirin delayed-release tablet 81 mg  81 mg Oral DAILY    atorvastatin (LIPITOR) tablet 20 mg  20 mg Oral DAILY    insulin lispro (HUMALOG) injection   SubCUTAneous Q6H     Prior to Admission medications    Not on File       ALLERGIES:  No Known Allergies    Review of Systems  Unable to review. The patient is confused. PHYSICAL EXAMINATION:     Visit Vitals  /86   Pulse 100   Temp 98.4 °F (36.9 °C)   Resp 22   Ht 5' 3\" (1.6 m)   Wt 65.8 kg (145 lb 1 oz)   SpO2 100%   BMI 25.70 kg/m²    O2 Flow Rate (L/min): 2 l/min O2 Device: Nasal cannula  GENERAL: Pleasant, laying in bed, confused. HEENT: Moist mucous membranes, sclerae anicteric, scalp is atraumatic. CVS: Regular rate and rhythm, no murmurs or gallops. No carotid bruits. PULMONARY: Clear to auscultation bilaterally. No rales or rhonchi. No wheezing. EXTREMITIES: Unable to move legs, in pain if moved. ABDOMEN: Soft, nontender. SKIN: No rashes or ecchymoses. Warm and dry. NEUROLOGIC: Alert and oriented to self only. Unable to assess language. She is able to look around. Face looks symmetric. PERRL. She is able to give me hand  weakly bilaterally. She does notwant to move LEs. She screams with pain when legs ae moved. DTRs not checked due to this.      Labs: Results:       Chemistry Recent Labs     11/06/21 0215 11/05/21  1241   * 578*    130*   K 4.5 5.0    101   CO2 19* 21   BUN 82* 94*   CREA 2.57* 3.14*   CA 8.6 8.6   AGAP 8 8   BUCR 32* 30*   * 158*   TP 6.6 6.7   ALB 2.0* 1.9*   GLOB 4.6* 4.8*   AGRAT 0.4* 0.4*      CBC w/Diff Recent Labs     11/06/21 0215 11/05/21  1241   WBC 15.6* 15.9*   RBC 3.10* 3.27*   HGB 9.5* 10.2*   HCT 30.1* 31.4*    244   GRANS 82* 84*   LYMPH 8* 7*   EOS 0 0      Cardiac Enzymes Recent Labs     11/06/21  0840 11/06/21  0215   CPK 1,151* 1,441*   CKND1 0.9 0.8      Coagulation Recent Labs     11/05/21  1241   PTP 15.5*   INR 1.3*   APTT 32.0       Lipid Panel No results found for: CHOL, CHOLPOCT, CHOLX, CHLST, CHOLV, [id number], HDL, HDLP, LDL, LDLC, DLDLP, 368265, VLDLC, VLDL, TGLX, TRIGL, TRIGP, TGLPOCT, CHHD, CHHDX   BNP No results for input(s): BNPP in the last 72 hours. Liver Enzymes Recent Labs     11/06/21  0215   TP 6.6   ALB 2.0*   *      Thyroid Studies Lab Results   Component Value Date/Time    TSH 1.35 11/06/2021 02:15 AM          Radiology:  CT HEAD WO CONT    Result Date: 11/5/2021  EXAM: CT head INDICATION: Altered mental status COMPARISON: None. TECHNIQUE: Axial CT imaging of the head was performed without intravenous contrast. Standard multiplanar coronal and sagittal reformatted images were obtained and are included in interpretation. One or more dose reduction techniques were used on this CT: automated exposure control, adjustment of the mAs and/or kVp according to patient size, and iterative reconstruction techniques. The specific techniques used on this CT exam have been documented in the patient's electronic medical record. Digital Imaging and Communications in Medicine (DICOM) format image data are available to nonaffiliated external healthcare facilities or entities on a secure, media free, reciprocally searchable basis with patient authorization for at least a 12-month period after this study. _______________ FINDINGS: BRAIN AND POSTERIOR FOSSA: There is patchy transcortical edema which extends throughout the majority of the anterior inferior left frontal lobe, left basal ganglia, left caudate, and paramedian aspects of the anterior left frontal lobe. There is suggestion of mild mass effect upon the anterior horn of the left lateral ventricle. No intra-axial hemorrhage. No evidence of midline shift. Subcortical and periventricular white matter low-attenuation noted, mild in nature. EXTRA-AXIAL SPACES AND MENINGES: No extra-axial fluid collection. CALVARIUM: Intact. SINUSES: Clear. OTHER: None. _______________     1.  Patchy areas of transcortical low-attenuation involving the left frontal lobe and left basal ganglia as described concerning for sequela of infarct. Although age indeterminate in CT appearance, there is suggestion of slight accompanying edema which would suggest a more recent infarction.   > No intra-axial or extra-axial hemorrhage.   > Minor mass effect on the anterior horn left lateral ventricle without evidence of midline shift. Subcortical and periventricular white matter low-attenuation, as can be seen with chronic ischemic microvascular change. Case and findings discussed with Dr. Marily Menon in the emergency room prior to dictation at 1440 hours, 11/5/2021    CT ABD PELV WO CONT    Result Date: 11/5/2021  EXAM: CT of the abdomen and pelvis INDICATION: Pain. COMPARISON: None. TECHNIQUE: Axial CT imaging of the abdomen and pelvis was performed with intravenous contrast. Multiplanar reformats were generated. One or more dose reduction techniques were used on this CT: automated exposure control, adjustment of the mAs and/or kVp according to patient size, and iterative reconstruction techniques. The specific techniques used on this CT exam have been documented in the patient's electronic medical record. Digital Imaging and Communications in Medicine (DICOM) format image data are available to nonaffiliated external healthcare facilities or entities on a secure, media free, reciprocally searchable basis with patient authorization for at least a 12-month period after this study. _______________ FINDINGS: LOWER CHEST: Unremarkable. LIVER, BILIARY: Liver is normal. No biliary dilation. Gallbladder is unremarkable. PANCREAS: Normal. SPLEEN: Normal. ADRENALS: Normal. KIDNEYS/URETERS/BLADDER: Moderate to severe bilateral hydroureteronephrosis with bilateral urothelial thickening. Distended urinary bladder with multiple diverticula, likely sequela of chronic outlet obstruction.  Associated mural thickening is concerning for superimposed infection. Gas within the bladder likely from recent manipulation. PELVIC ORGANS: Unremarkable. VASCULATURE: Scattered vascular calcifications. LYMPH NODES: No enlarged lymph nodes. GASTROINTESTINAL TRACT: No bowel dilation or wall thickening. Rectal fecal impaction. BONES: No acute or aggressive osseous abnormalities identified. T12 anterior wedging deformity. Prior right hip ORIF. OTHER: None. _______________     Thick-walled urinary bladder with associated stranding suggesting cystitis. Gas within the urinary bladder likely from recent manipulation. Bilateral moderate to severe hydroureteronephrosis with associated urothelial thickening suggesting superimposed infection. XR CHEST PORT    Result Date: 11/5/2021  EXAM: XR CHEST PORT CLINICAL INDICATION/HISTORY: meets SIRS criteria -Additional: Shortness of breath, respiratory distress with altered mental status COMPARISON: None TECHNIQUE: Frontal view of the chest _______________ FINDINGS: HEART AND MEDIASTINUM: Normal cardiac size and mediastinal contours. LUNGS AND PLEURAL SPACES: Lungs are mildly underexpanded. There is no focal pneumonic consolidation. No evidence of pneumothorax or pleural effusion. BONY THORAX AND SOFT TISSUES: No acute osseous abnormality. Superposition of bowel loops over the upper abdomen noted. _______________     Mildly underexpanded lungs without superimposed acute radiographic abnormality. ASSESSMENT/IMPRESSION:  AMS in the setting of dementia, left frontal infarct, UTI with urinary retention. I explained to Jitendraserjio Vazquez that she possibly has UTI and hydronephrosis due to urinary retention. She may need Dasilva placed and followed for postobstructive diuresis. Left frontal stroke needs further work up. However, the patient's Lethia Dyer decides for comfort care. She does not want further interventions or evaluations at this time. RECOMMENDATIONS:  1. No further recommendations from neurology. 2. Comfort care measures will be taken.  Dr. Ryan Page was present during the clinical encounter.     Please do not hesitate to return with any questions.    ------------------------------------  Cheri Baker MD  11/6/2021  11:23 AM

## 2021-11-06 NOTE — PROGRESS NOTES
PHYSICAL THERAPY         Patient: Katarzyna Alicia (43 y.o. female)  Room: [facility]/    Date: 11/6/2021  Time:  10:24  Primary Diagnosis: Stroke [facility]) [I63.9]    Orders reviewed, chart reviewed. Communicated with patient's nurse. Physical therapy was attempted, however patient sleeping soundly and patient's family requested to let patient rest. Family states they are waiting for a palliative care consult on Monday and they don't want PT services until further decisions are made  Will follow up as requested.       Rose Valencia, PT

## 2021-11-06 NOTE — PROGRESS NOTES
Speech Pathology:     Orders received and completed as pt has been transitioned to comfort care. Please re-consult if needed.     Thank you for this referral.    Christina Vasquez M.S. CCC-SLP/L  Speech-Language Pathologist

## 2021-11-06 NOTE — PROGRESS NOTES
Problem: Falls - Risk of  Goal: *Absence of Falls  Description: Document Joaquin Jean Fall Risk and appropriate interventions in the flowsheet.   Outcome: Progressing Towards Goal  Note: Fall Risk Interventions:  Mobility Interventions: PT Consult for mobility concerns    Mentation Interventions: Bed/chair exit alarm    Medication Interventions: Bed/chair exit alarm    Elimination Interventions: Bed/chair exit alarm, Call light in reach    History of Falls Interventions: Bed/chair exit alarm

## 2021-11-07 PROCEDURE — 65660000000 HC RM CCU STEPDOWN

## 2021-11-07 PROCEDURE — 74011000250 HC RX REV CODE- 250: Performed by: HOSPITALIST

## 2021-11-07 PROCEDURE — 74011250637 HC RX REV CODE- 250/637: Performed by: HOSPITALIST

## 2021-11-07 PROCEDURE — 77010033678 HC OXYGEN DAILY

## 2021-11-07 PROCEDURE — 74011250636 HC RX REV CODE- 250/636: Performed by: HOSPITALIST

## 2021-11-07 PROCEDURE — 74011250637 HC RX REV CODE- 250/637: Performed by: FAMILY MEDICINE

## 2021-11-07 RX ORDER — ONDANSETRON 4 MG/1
4 TABLET, ORALLY DISINTEGRATING ORAL
Status: DISCONTINUED | OUTPATIENT
Start: 2021-11-07 | End: 2021-11-08 | Stop reason: HOSPADM

## 2021-11-07 RX ORDER — AMOXICILLIN 250 MG
2 CAPSULE ORAL
Status: DISCONTINUED | OUTPATIENT
Start: 2021-11-07 | End: 2021-11-08 | Stop reason: HOSPADM

## 2021-11-07 RX ORDER — ACETAMINOPHEN 325 MG/1
650 TABLET ORAL
Status: DISCONTINUED | OUTPATIENT
Start: 2021-11-07 | End: 2021-11-08 | Stop reason: HOSPADM

## 2021-11-07 RX ORDER — ALBUTEROL SULFATE 0.83 MG/ML
2.5 SOLUTION RESPIRATORY (INHALATION)
Status: DISCONTINUED | OUTPATIENT
Start: 2021-11-07 | End: 2021-11-08 | Stop reason: HOSPADM

## 2021-11-07 RX ORDER — MORPHINE SULFATE 2 MG/ML
2 INJECTION, SOLUTION INTRAMUSCULAR; INTRAVENOUS
Status: DISCONTINUED | OUTPATIENT
Start: 2021-11-07 | End: 2021-11-08 | Stop reason: HOSPADM

## 2021-11-07 RX ORDER — LORAZEPAM 2 MG/ML
1 CONCENTRATE ORAL
Status: DISCONTINUED | OUTPATIENT
Start: 2021-11-07 | End: 2021-11-08 | Stop reason: HOSPADM

## 2021-11-07 RX ORDER — ACETAMINOPHEN 650 MG/1
650 SUPPOSITORY RECTAL
Status: DISCONTINUED | OUTPATIENT
Start: 2021-11-07 | End: 2021-11-08 | Stop reason: HOSPADM

## 2021-11-07 RX ORDER — SCOLOPAMINE TRANSDERMAL SYSTEM 1 MG/1
1 PATCH, EXTENDED RELEASE TRANSDERMAL
Status: DISCONTINUED | OUTPATIENT
Start: 2021-11-07 | End: 2021-11-08 | Stop reason: HOSPADM

## 2021-11-07 RX ADMIN — WATER 1 G: 1 INJECTION INTRAMUSCULAR; INTRAVENOUS; SUBCUTANEOUS at 16:52

## 2021-11-07 RX ADMIN — LORAZEPAM 1 MG: 2 SOLUTION, CONCENTRATE ORAL at 18:34

## 2021-11-07 RX ADMIN — ASPIRIN 81 MG: 81 TABLET, COATED ORAL at 08:00

## 2021-11-07 NOTE — PROGRESS NOTES
D/C Plan: [facility] 11/8/21    CM met with pt's sister, Kindra Schroeder ([phone]), at bedside to discuss transition of care. Family would like to pt to return to [facility] under comfort care. Anticipate pt will return to [facility] tomorrow. CM to continue to follow and assist.      Transition of care to SNF: [address] [place] [address],[facility] Floor     Communication to Patient/Family:  Met with patient and family, and they are agreeable to the transition plan. The Plan for Transition of Care is related to the following treatment goals: LTC    The Patient and/or patient representative was provided with a choice of provider and agrees   with the discharge plan. Yes [x] No []    Freedom of choice list was provided with basic dialogue that supports the patient's individualized plan of care/goals and shares the quality data associated with the providers. Yes [x] No []    SNF/Rehab Transition:  Patient has been accepted to [address] [place] [address],[address] and meets criteria for admission. Patient will transported by medical transport and expected to leave tomorrow (11/8/21). Communication to SNF/Rehab:  Bedside RN, has been notified to update the transition plan to the facility and call report [phone]    Discharge information has been updated on the AVS and sent via [facility] and/or CC link. Discharge instructions to be fax'd to facility at [phone]     Please include all hard scripts for controlled substances, med rec and dc summary, and AVS in packet. Please medicate for pain prior to dc if possible and needed to help offset delay when patient first arrives to facility. Reviewed and confirmed with facility, [facility] of , can manage the patient care needs for the following:     Sofy Solis with (X) only those applicable:  Medication:  []Medications are available at the facility  []IV Antibiotics    []Controlled Substance - hard copies available sent.   []Weekly Labs    Equipment:  []CPAP/BiPAP  []Wound Vacuum  []Dasilva or Urinary Device  []PICC/Central Line  []Nebulizer  []Ventilator    Treatment:  []Isolation (for MRSA, VRE, etc.)  []Surgical Drain Management  []Tracheostomy Care  []Dressing Changes  []Dialysis with transportation  []PEG Care  []Oxygen  []Daily Weights for Heart Failure    Dietary:  []Any diet limitations  []Tube Feedings   []Total Parenteral Management (TPN)    Financial Resources:  []Medicaid Application Completed    []UAI Completed and copy given to pt/family. [x]A screening has previously been completed. []Level II Completed    [] Private pay individual who will not become financially eligible for Medicaid within 6 months from admission to a [address] [place] [address] facility. [] Individual refused to have screening conducted. []Medicaid Application Completed    []The screening denied because it was determined individual did not need/did not qualify for nursing facility level of care. [] Out of state residents seeking direct admission to a [address] facility. [] Individuals who are inpatients of an out of state hospital, or in state or out of state veterans/ hospital and seek direct admission to a [address] facility  [] Individuals who are pateints or residents of a state owned/operated facility that is licensed by [employer] of [employer] ([employer]) and seek direct admission to [address] [place] [address]  [] A screening not required for enrollment in [facility] as set out in [address] [place] [address] 90-  [] [facility]) staff shall perform screenings of the [facility] clients. Advanced Care Plan:  []Surrogate Decision Maker of Care  []POA  []Communicated Code Status and copy sent.     Other:         Care Management Interventions  Transition of Care Consult (CM Consult): [address], Discharge Planning  Support Systems: Child(reynaldo), Pablo  Confirm Follow Up Transport:  (stretcher transport)  The Plan for Transition of Care is Related to the Following Treatment Goals : sergio  Freedom of Choice List was Provided with Basic Dialogue that Supports the Patient's Individualized Plan of Care/Goals, Treatment Preferences and Shares the Quality Data Associated with the Providers?: Yes  Discharge Location  Discharge Placement: [facility],[address]

## 2021-11-07 NOTE — PROGRESS NOTES
Problem: Falls - Risk of  Goal: *Absence of Falls  Description: Document J Luis Carlos Fall Risk and appropriate interventions in the flowsheet.   Outcome: Progressing Towards Goal  Note: Fall Risk Interventions:  Mobility Interventions: Bed/chair exit alarm    Mentation Interventions: Bed/chair exit alarm    Medication Interventions: Bed/chair exit alarm    Elimination Interventions: Bed/chair exit alarm    History of Falls Interventions: Door open when patient unattended

## 2021-11-07 NOTE — PROGRESS NOTES
Hospitalist Progress Note    Patient: Jennifer Grady MRN: [id number]  CSN: [id number]    YOB: 1950  Age: 80 y.o. Sex: female    DOA: 11/5/2021 LOS:  LOS: 2 days            Assessment/Plan     Principal Problem:    Stroke [facility]) (11/5/2021)    Active Problems:    Type II or unspecified type diabetes mellitus with renal manifestations, uncontrolled(250.42) ([facility] (11/5/2021)      Hyperglycemia due to diabetes mellitus ([facility] (11/5/2021)      YUNIOR (acute kidney injury) ([facility] (11/5/2021)      UTI (urinary tract infection) (11/5/2021)      Hypothyroidism ()      Hyperlipidemia ()      HTN (hypertension) ()      Dementia (HCC) ()      Elevated troponin (11/5/2021)      Hyponatremia (11/5/2021)      CVA: Likely episode 3 weeks ago,  Started on aspirin, statin. Discussed the case with Dr. Thang Montelongo regarding the treatment plan. We both talked to the pt's POA/sister about the pt's dx and treatment plan. The pt's POA insisted to place the pt with comfort care only.     DM with hyperglycemia: on basal insulin, sliding scale insulin. Check hemoglobin A1c.     Acute kidney injury: CT scan with bilateral moderate to severe hydroureteral nephrosis with associated urothelial thickening suggesting infection and bladder retention. Her POA refused the ricardo cath. Started on IV fluids  Follow renal function.     UTI: on ceftriaxone  Follow urine cultures     Elevated troponin: Trend cardiac enzymes  Cardiology consulted     Hypertension: Blood pressure on the lower side, monitor blood pressure.     Hypothyroidism: Subclinical, check TSH.     Hyperlipidemia: Continue with statin     Hyponatremia: Secondary to elevated blood sugars     Dementia     DNR/DNI     Family decided to make the patient comfort care only since 11/06    Discussed the case with the .  Arrange comfort care at SNF in 1-2 day      CC: Altered mental status            Subjective:      Pt was seen and examined with the nurse in the morning round.     No acute event at night     Her POA/sister was at the room.     Review of systems  Unobtainable. Objective:      Visit Vitals  BP (!) 134/59   Pulse (!) 107   Temp 98.7 °F (37.1 °C)   Resp 22   Ht 5' 3\" (1.6 m)   Wt 64.1 kg (141 lb 4.8 oz)   SpO2 100%   BMI 25.03 kg/m²       Physical Exam:    Gen: NAD, frail. Ill appearing   Heent:  MMM, NC, AT. Cor: s1s2 RRR. No MRG. Resp:  CTA b/l. No w/r/r. Nml effort and diaphragmatic excursion. Abd:  NT ND.  BS positive. No rebound or guarding. No masses. Ext: contracted both legs. Screams with pain when legs are moved. Intake and Output:  Current Shift:  No intake/output data recorded. Last three shifts:  No intake/output data recorded. Labs: Results:       Chemistry Recent Labs     11/06/21 0215 11/05/21  1241   * 578*    130*   K 4.5 5.0    101   CO2 19* 21   BUN 82* 94*   CREA 2.57* 3.14*   CA 8.6 8.6   AGAP 8 8   BUCR 32* 30*   * 158*   TP 6.6 6.7   ALB 2.0* 1.9*   GLOB 4.6* 4.8*   AGRAT 0.4* 0.4*      CBC w/Diff Recent Labs     11/06/21  0215 11/05/21  1241   WBC 15.6* 15.9*   RBC 3.10* 3.27*   HGB 9.5* 10.2*   HCT 30.1* 31.4*    244   GRANS 82* 84*   LYMPH 8* 7*   EOS 0 0      Cardiac Enzymes Recent Labs     11/06/21  0840 11/06/21  0215   CPK 1,151* 1,441*   CKND1 0.9 0.8      Coagulation Recent Labs     11/05/21  1241   PTP 15.5*   INR 1.3*   APTT 32.0       Lipid Panel No results found for: CHOL, CHOLPOCT, CHOLX, CHLST, CHOLV, 296653, HDL, HDLP, LDL, LDLC, DLDLP, 836123, VLDLC, VLDL, TGLX, TRIGL, TRIGP, TGLPOCT, CHHD, CHHDX   BNP No results for input(s): BNPP in the last 72 hours.    Liver Enzymes Recent Labs     11/06/21  0215   TP 6.6   ALB 2.0*   *      Thyroid Studies Lab Results   Component Value Date/Time    TSH 1.35 11/06/2021 02:15 AM        Procedures/imaging: see electronic medical records for all procedures/Xrays and details which were not copied into this note but were reviewed prior to creation of Plan      Medications Reviewed  Aileen Porras MD

## 2021-11-07 NOTE — CONSULTS
[facility] Consult Note      Patient: Yobany Schuster MRN: [id number]  SSN: [id number]    YOB: 1950  Age: 80 y.o. Sex: female    Date of Consultation: 11/05/2021  Referring Physician: Raulito Villafuerte MD  Reason for Consultation: Abnormal troponin    Chief complain: Altered mental status     HPI:   78-year-old female brought to emergency room from [facility] due to altered mental status. Patient is not in St to provide any history. History obtained from patient's chart. For past few days mental status of the patient is declined. In emergency room patient was tachypnea can tachycardia. She is being managed for urinary tract infection. Cardiology consult called in view of abnormal troponin. No other history is available at this time.     Past Medical History:   Diagnosis Date    Dementia ([address]     HTN (hypertension)     Hyperlipidemia     Hypothyroidism      Past Surgical History:   Procedure Laterality Date    HX FEMUR FRACTURE TX       Current Facility-Administered Medications   Medication Dose Route Frequency    sodium chloride (NS) flush 5-10 mL  5-10 mL IntraVENous PRN    0.9% sodium chloride infusion  100 mL/hr IntraVENous CONTINUOUS    insulin glargine (LANTUS) injection 20 Units  0.3 Units/kg SubCUTAneous QHS    glucose chewable tablet 16 g  4 Tablet Oral PRN    glucagon (GLUCAGEN) injection 1 mg  1 mg IntraMUSCular PRN    dextrose (D50W) injection syrg 12.5-25 g  25-50 mL IntraVENous PRN    glucose chewable tablet 16 g  16 g Oral PRN    glucagon (GLUCAGEN) injection 1 mg  1 mg IntraMUSCular PRN    dextrose (D50W) injection syrg 25 g  50 mL IntraVENous PRN    cefTRIAXone (ROCEPHIN) 1 g in sterile water (preservative free) 10 mL IV syringe  1 g IntraVENous Q24H    aspirin delayed-release tablet 81 mg  81 mg Oral DAILY    atorvastatin (LIPITOR) tablet 20 mg  20 mg Oral DAILY    insulin lispro (HUMALOG) injection   SubCUTAneous Q6H       Allergies and Intolerances:   No Known Allergies    Family History:   No family history on file. Social History:   She  has no history on file for tobacco use. She  has no history on file for alcohol use. Review of Systems:     Can not obtained due to patient's condition    Physical:   Patient Vitals for the past 6 hrs:   Temp Pulse Resp BP SpO2   11/06/21 1649 98.3 °F (36.8 °C) 94 22 (!) 119/57 98 %         Exam:   General Appearance: Comfortable, not using accessory muscles of respiration. HEENT: NANDA. HEAD: Atraumatic  NECK: No JVD, no thyroidomeglay. CAROTIDS: No bruit  LUNGS: Clear bilaterally. HEART: S1+S2 audible, no murmur, no pericardial rub. ABD: Non-tender, BS Audible    EXT: Contracted extremities     PSYCHIATRIC EXAM: Mood is appropriate. MUSCULOSKELETAL: contracted extremities .   NEUROLOGICAL: opens eyes, not following verbal commands    Review of Data:   LABS:   Lab Results   Component Value Date/Time    WBC 15.6 (H) 11/06/2021 02:15 AM    HGB 9.5 (L) 11/06/2021 02:15 AM    HCT 30.1 (L) 11/06/2021 02:15 AM    PLATELET 770 92/78/4736 02:15 AM     Lab Results   Component Value Date/Time    Sodium 138 11/06/2021 02:15 AM    Potassium 4.5 11/06/2021 02:15 AM    Chloride 111 11/06/2021 02:15 AM    CO2 19 (L) 11/06/2021 02:15 AM    Glucose 372 (H) 11/06/2021 02:15 AM    BUN 82 (H) 11/06/2021 02:15 AM    Creatinine 2.57 (H) 11/06/2021 02:15 AM     No results found for: CHOL, CHOLX, CHLST, CHOLV, HDL, HDLP, LDL, LDLC, DLDLP, TGLX, TRIGL, TRIGP  No components found for: GPT  Lab Results   Component Value Date/Time    Hemoglobin A1c 7.9 (H) 11/06/2021 02:15 AM         Cardiology Procedures:   Results for orders placed or performed during the hospital encounter of 11/05/21   EKG, 12 LEAD, INITIAL   Result Value Ref Range    Ventricular Rate 107 BPM    Atrial Rate 107 BPM    P-R Interval 178 ms    QRS Duration 104 ms    Q-T Interval 354 ms    QTC Calculation (Bezet) 472 ms    Calculated P Axis 58 degrees    Calculated R Axis 14 degrees    Calculated T Axis 180 degrees    Diagnosis       Sinus tachycardia with premature atrial complexes  ST & T wave abnormality, consider inferolateral ischemia  Abnormal ECG  Confirmed by Eduardo Gonzalez MD, Chaya Serrano ([id number]) on 11/6/2021 1:30:12 PM             Impression / Plan:    Patient Active Problem List   Diagnosis Code    Stroke ([facility] I63.9    Type II or unspecified type diabetes mellitus with renal manifestations, uncontrolled(250.42) ([facility]) E11.29, E11.65    Hyperglycemia due to diabetes mellitus ([facility]) E11.65    YUNIOR (acute kidney injury) ([facility] N17.9    UTI (urinary tract infection) N39.0    Hypothyroidism E03.9    Hyperlipidemia E78.5    HTN (hypertension) I10    Dementia ([facility]) F03.90    Elevated troponin R77.8    Hyponatremia E87.1     Rhabdomyolysis  Abnormal troponin no evidence of acute coronary syndrome could be due to demand ischemia    CT head reported    1. Patchy areas of transcortical low-attenuation involving the left frontal lobe  and left basal ganglia as described concerning for sequela of infarct. Although  age indeterminate in CT appearance, there is suggestion of slight accompanying  edema which would suggest a more recent infarction.     > No intra-axial or extra-axial hemorrhage.     > Minor mass effect on the anterior horn left lateral ventricle without  evidence of midline shift.     Subcortical and periventricular white matter low-attenuation, as can be seen  with chronic ischemic microvascular change.     Plan:    Hold atorvastatin due to elevated CPK  Continue current management  Patient is DNR/DNI      Signed By: Lord Bridgett MD     November 6, 2021

## 2021-11-08 VITALS
WEIGHT: 141.3 LBS | RESPIRATION RATE: 30 BRPM | HEIGHT: 63 IN | BODY MASS INDEX: 25.04 KG/M2 | TEMPERATURE: 102.4 F | OXYGEN SATURATION: 94 % | SYSTOLIC BLOOD PRESSURE: 130 MMHG | HEART RATE: 118 BPM | DIASTOLIC BLOOD PRESSURE: 75 MMHG

## 2021-11-08 LAB
COVID-19 RAPID TEST, COVR: NOT DETECTED
SOURCE, COVRS: NORMAL

## 2021-11-08 PROCEDURE — 74011250637 HC RX REV CODE- 250/637: Performed by: FAMILY MEDICINE

## 2021-11-08 PROCEDURE — 74011250636 HC RX REV CODE- 250/636: Performed by: FAMILY MEDICINE

## 2021-11-08 PROCEDURE — 87635 SARS-COV-2 COVID-19 AMP PRB: CPT

## 2021-11-08 RX ORDER — SCOLOPAMINE TRANSDERMAL SYSTEM 1 MG/1
1 PATCH, EXTENDED RELEASE TRANSDERMAL
Qty: 1 PATCH | Refills: 0 | Status: SHIPPED
Start: 2021-11-08

## 2021-11-08 RX ADMIN — MORPHINE SULFATE 2 MG: 2 INJECTION, SOLUTION INTRAMUSCULAR; INTRAVENOUS at 01:13

## 2021-11-08 RX ADMIN — LORAZEPAM 1 MG: 2 SOLUTION, CONCENTRATE ORAL at 13:36

## 2021-11-08 RX ADMIN — MORPHINE SULFATE 2 MG: 2 INJECTION, SOLUTION INTRAMUSCULAR; INTRAVENOUS at 15:07

## 2021-11-08 RX ADMIN — MORPHINE SULFATE 2 MG: 2 INJECTION, SOLUTION INTRAMUSCULAR; INTRAVENOUS at 18:14

## 2021-11-08 RX ADMIN — MORPHINE SULFATE 2 MG: 2 INJECTION, SOLUTION INTRAMUSCULAR; INTRAVENOUS at 04:35

## 2021-11-08 RX ADMIN — ACETAMINOPHEN 650 MG: 650 SUPPOSITORY RECTAL at 18:14

## 2021-11-08 RX ADMIN — MORPHINE SULFATE 2 MG: 2 INJECTION, SOLUTION INTRAMUSCULAR; INTRAVENOUS at 12:36

## 2021-11-08 NOTE — PROGRESS NOTES
11/8/2021 PT note: consult received and chart reviewed. Pt has orders for Comfort Care Measures only since initial PT Consult received. Will acknowledge and d/c PT orders as appropriate.   Thank you for this referral.   Ines Cedeño, PT

## 2021-11-08 NOTE — PROGRESS NOTES
Problem: Falls - Risk of  Goal: *Absence of Falls  Description: Document Ros Coleman Fall Risk and appropriate interventions in the flowsheet.   Outcome: Progressing Towards Goal  Note: Fall Risk Interventions:  Mobility Interventions: Bed/chair exit alarm    Mentation Interventions: Bed/chair exit alarm    Medication Interventions: Bed/chair exit alarm    Elimination Interventions: Bed/chair exit alarm    History of Falls Interventions: Door open when patient unattended         Problem: Patient Education: Go to Patient Education Activity  Goal: Patient/Family Education  Outcome: Progressing Towards Goal     Problem: Ischemic Stroke: Discharge Outcomes  Goal: *Verbalizes anxiety and depression are reduced or absent  Outcome: Progressing Towards Goal  Goal: *Verbalize understanding of risk factor modification(Stroke Metric)  Outcome: Progressing Towards Goal  Goal: *Hemodynamically stable  Outcome: Progressing Towards Goal  Goal: *Absence of aspiration pneumonia  Outcome: Progressing Towards Goal  Goal: *Aware of needed dietary changes  Outcome: Progressing Towards Goal  Goal: *Verbalize understanding of prescribed medications including anti-coagulants, anti-lipid, and/or anti-platelets(Stroke Metric)  Outcome: Progressing Towards Goal  Goal: *Tolerating diet  Outcome: Progressing Towards Goal  Goal: *Aware of follow-up diagnostics related to anticoagulants  Outcome: Progressing Towards Goal  Goal: *Ability to perform ADLs and demonstrates progressive mobility and function  Outcome: Progressing Towards Goal  Goal: *Absence of DVT(Stroke Metric)  Outcome: Progressing Towards Goal  Goal: *Absence of aspiration  Outcome: Progressing Towards Goal  Goal: *Optimal pain control at patient's stated goal  Outcome: Progressing Towards Goal  Goal: *Home safety concerns addressed  Outcome: Progressing Towards Goal  Goal: *Describes available resources and support systems  Outcome: Progressing Towards Goal  Goal: *Verbalizes understanding of activation of EMS(911) for stroke symptoms(Stroke Metric)  Outcome: Progressing Towards Goal  Goal: *Understands and describes signs and symptoms to report to providers(Stroke Metric)  Outcome: Progressing Towards Goal  Goal: *Neurolgocially stable (absence of additional neurological deficits)  Outcome: Progressing Towards Goal  Goal: *Verbalizes importance of follow-up with primary care physician(Stroke Metric)  Outcome: Progressing Towards Goal  Goal: *Smoking cessation discussed,if applicable(Stroke Metric)  Outcome: Progressing Towards Goal  Goal: *Depression screening completed(Stroke Metric)  Outcome: Progressing Towards Goal     Problem: Diabetes Self-Management  Goal: *Disease process and treatment process  Description: Define diabetes and identify own type of diabetes; list 3 options for treating diabetes. Outcome: Progressing Towards Goal  Goal: *Incorporating nutritional management into lifestyle  Description: Describe effect of type, amount and timing of food on blood glucose; list 3 methods for planning meals. Outcome: Progressing Towards Goal  Goal: *Incorporating physical activity into lifestyle  Description: State effect of exercise on blood glucose levels. Outcome: Progressing Towards Goal  Goal: *Developing strategies to promote health/change behavior  Description: Define the ABC's of diabetes; identify appropriate screenings, schedule and personal plan for screenings. Outcome: Progressing Towards Goal  Goal: *Using medications safely  Description: State effect of diabetes medications on diabetes; name diabetes medication taking, action and side effects. Outcome: Progressing Towards Goal  Goal: *Monitoring blood glucose, interpreting and using results  Description: Identify recommended blood glucose targets  and personal targets.   Outcome: Progressing Towards Goal  Goal: *Prevention, detection, treatment of acute complications  Description: List symptoms of hyper- and hypoglycemia; describe how to treat low blood sugar and actions for lowering  high blood glucose level. Outcome: Progressing Towards Goal  Goal: *Prevention, detection and treatment of chronic complications  Description: Define the natural course of diabetes and describe the relationship of blood glucose levels to long term complications of diabetes. Outcome: Progressing Towards Goal  Goal: *Developing strategies to address psychosocial issues  Description: Describe feelings about living with diabetes; identify support needed and support network  Outcome: Progressing Towards Goal  Goal: *Insulin pump training  Outcome: Progressing Towards Goal  Goal: *Sick day guidelines  Outcome: Progressing Towards Goal  Goal: *Patient Specific Goal (EDIT GOAL, INSERT TEXT)  Outcome: Progressing Towards Goal     Problem: Patient Education: Go to Patient Education Activity  Goal: Patient/Family Education  Outcome: Progressing Towards Goal     Problem: Pressure Injury - Risk of  Goal: *Prevention of pressure injury  Description: Document Oswaldo Scale and appropriate interventions in the flowsheet.   Outcome: Progressing Towards Goal     Problem: Patient Education: Go to Patient Education Activity  Goal: Patient/Family Education  Outcome: Progressing Towards Goal

## 2021-11-08 NOTE — PROGRESS NOTES
Palliative Medicine    Chart reviewed. Jill Ruiz was admitted 11/5/2021 after a stroke. She is a resident at [facility]. Over the weekend, her sister made the decision to change the focus of care to comfort and have her return to her facility receiving comfort care. Comfort orders had been placed by the hospitalist.    0988: patient seen in her room. Asleep. Respirations exaggerated with snoring. Did not open eyes when her name was called. Skin warm and dry. 1515--spoke with Yandel Bey, patient's sister. She states that she and her sister Jaya Pedraza had 10 years of discussions about this and there is no doubt this what her sister would want. After seeing with patient and speaking with her sister, the goals of care have been defined. Code status remains: DNR with comfort measures  Will sign off but remain available for reconsult as needed/if appropriate.      Thank you for the Palliative Medicine consult and allowing us to participate in the care of Tu Page RN, MSN  Palliative Medicine     [phone]

## 2021-11-08 NOTE — DISCHARGE SUMMARY
Discharge Summary    Patient: Eri Hagan MRN: [id number]  CSN: [id number]    YOB: 1950  Age: 80 y.o. Sex: female    DOA: 11/5/2021 LOS:  LOS: 3 days   Discharge Date:      Primary Care Provider:  Other, MD Heidy    Admission Diagnoses: Stroke [facility]) [I63.9]    Discharge Diagnoses:    Problem List as of 11/8/2021 Date Reviewed: 11/6/2021          Codes Class Noted - Resolved    * (Principal) Stroke [facility]) ICD-10-CM: I63.9  ICD-9-CM: 434.91  11/5/2021 - Present        Type II or unspecified type diabetes mellitus with renal manifestations, uncontrolled(250.42) ([facility] ICD-10-CM: E11.29, E11.65  ICD-9-CM: 250.42  11/5/2021 - Present        Hyperglycemia due to diabetes mellitus ([facility] ICD-10-CM: E11.65  ICD-9-CM: 250.02  11/5/2021 - Present        YUNIOR (acute kidney injury) ([facility] ICD-10-CM: N17.9  ICD-9-CM: 584.9  11/5/2021 - Present        UTI (urinary tract infection) ICD-10-CM: N39.0  ICD-9-CM: 599.0  11/5/2021 - Present        Hypothyroidism ICD-10-CM: E03.9  ICD-9-CM: 244.9  Unknown - Present        Hyperlipidemia ICD-10-CM: E78.5  ICD-9-CM: 272.4  Unknown - Present        HTN (hypertension) ICD-10-CM: I10  ICD-9-CM: 401.9  Unknown - Present        Dementia ([facility] ICD-10-CM: F03.90  ICD-9-CM: 294.20  Unknown - Present        Elevated troponin ICD-10-CM: R77.8  ICD-9-CM: 790.6  11/5/2021 - Present        Hyponatremia ICD-10-CM: E87.1  ICD-9-CM: 276.1  11/5/2021 - Present              Discharge Medications:     Current Discharge Medication List      START taking these medications    Details   scopolamine (TRANSDERM-SCOP) 1 mg over 3 days pt3d 1 Patch by TransDERmal route every seventy-two (72) hours as needed for PRN Reason (Other) (SECRETIONS). Qty: 1 Patch, Refills: 0  Start date: 11/8/2021             Discharge Condition: terminal    Procedures : None    Consults: Cardiology and Neurology      PHYSICAL EXAM   Visit Vitals  BP (!) 104/55 (BP 1 Location: Right upper arm, BP Patient Position:  At rest)   Pulse (!) 59   Temp (!) 100.6 °F (38.1 °C)   Resp (!) 35   Ht 5' 3\" (1.6 m)   Wt 64.1 kg (141 lb 4.8 oz)   SpO2 (!) 88%   BMI 25.03 kg/m²     General: unresponsive    Lungs:  CTA Bilaterally. No Wheezing/Rhonchi/Rales. Heart:  Regular  rhythm,  No murmur, No Rubs, No Gallops  Abdomen: Soft, Non distended, Non tender. +Bowel sounds,                                       Admission HPI :   Cristobal Myers is a 80 y.o. female with hypertension, hyperlipidemia, hypothyroidism, dementia is brought to ER from the [address] [place] [address],[address].  Patient not able to provide any reliable history due to dementia, history obtained from patient's sister. sister reports that patient has been declining in her mental status for the past 3 weeks. She has been lucid and responds to questions however 3 weeks ago she started having some confusion and decline in her status. She was checked for urine infection and was placed on Bactrim. Per sister her mental status declined significantly for the past couple of days. Sister reports that she has been talking to the nursing home regarding hospice. EMS was called and patient sent to ER for UTI. In ER she was noted to be tachypneic and tachycardic. Her urine with evidence of UTI, WBC at 15.9, sodium of 130, glucose 578, BUN/creatinine at 94/3.14, troponin of 0.25, NT proBNP of 8655. CT head showed patchy areas of transcortical low-attenuation involving the left frontal lobe and left basal ganglia concerning for sequela of infarct. There is suggestion of slight accompanying edema. Minor mass-effect on the anterior horn without evidence of midline shift, no intracranial hemorrhage. Hospital Course :   Ms. Zaire Oneil was admitted to monitored floor, she was seen by neurology and cardiology. She was started on aspirin and statin. She was also started on antibiotics for UTI. Her MPOA did not want any aggressive treatment.  After discussion with MPOA, she decided on comfort measures. Activity: Activity as tolerated    Diet: Comfort feeding    Follow-up: discharge with hospice    Disposition: hospice at facility    Minutes spent on discharge: 40       Labs: Results:       Chemistry Recent Labs     11/06/21 0215   *      K 4.5      CO2 19*   BUN 82*   CREA 2.57*   CA 8.6   AGAP 8   BUCR 32*   *   TP 6.6   ALB 2.0*   GLOB 4.6*   AGRAT 0.4*      CBC w/Diff Recent Labs     11/06/21 0215   WBC 15.6*   RBC 3.10*   HGB 9.5*   HCT 30.1*      GRANS 82*   LYMPH 8*   EOS 0      Cardiac Enzymes Recent Labs     11/06/21  0840 11/06/21 0215   CPK 1,151* 1,441*   CKND1 0.9 0.8      Coagulation No results for input(s): PTP, INR, APTT, INREXT in the last 72 hours. Lipid Panel No results found for: CHOL, CHOLPOCT, CHOLX, CHLST, CHOLV, 637982, HDL, HDLP, LDL, LDLC, DLDLP, 052254, VLDLC, VLDL, TGLX, TRIGL, TRIGP, TGLPOCT, CHHD, CHHDX   BNP No results for input(s): BNPP in the last 72 hours. Liver Enzymes Recent Labs     11/06/21 0215   TP 6.6   ALB 2.0*   *      Thyroid Studies Lab Results   Component Value Date/Time    TSH 1.35 11/06/2021 02:15 AM            Significant Diagnostic Studies: CT HEAD WO CONT    Result Date: 11/5/2021  EXAM: CT head INDICATION: Altered mental status COMPARISON: None. TECHNIQUE: Axial CT imaging of the head was performed without intravenous contrast. Standard multiplanar coronal and sagittal reformatted images were obtained and are included in interpretation. One or more dose reduction techniques were used on this CT: automated exposure control, adjustment of the mAs and/or kVp according to patient size, and iterative reconstruction techniques. The specific techniques used on this CT exam have been documented in the patient's electronic medical record.   Digital Imaging and Communications in Medicine (DICOM) format image data are available to nonaffiliated external healthcare facilities or entities on a secure, media free, reciprocally searchable basis with patient authorization for at least a 12-month period after this study. _______________ FINDINGS: BRAIN AND POSTERIOR FOSSA: There is patchy transcortical edema which extends throughout the majority of the anterior inferior left frontal lobe, left basal ganglia, left caudate, and paramedian aspects of the anterior left frontal lobe. There is suggestion of mild mass effect upon the anterior horn of the left lateral ventricle. No intra-axial hemorrhage. No evidence of midline shift. Subcortical and periventricular white matter low-attenuation noted, mild in nature. EXTRA-AXIAL SPACES AND MENINGES: No extra-axial fluid collection. CALVARIUM: Intact. SINUSES: Clear. OTHER: None. _______________     1. Patchy areas of transcortical low-attenuation involving the left frontal lobe and left basal ganglia as described concerning for sequela of infarct. Although age indeterminate in CT appearance, there is suggestion of slight accompanying edema which would suggest a more recent infarction.   > No intra-axial or extra-axial hemorrhage.   > Minor mass effect on the anterior horn left lateral ventricle without evidence of midline shift. Subcortical and periventricular white matter low-attenuation, as can be seen with chronic ischemic microvascular change. Case and findings discussed with Dr. Henry Mercado in the emergency room prior to dictation at 1440 hours, 11/5/2021    CT ABD PELV WO CONT    Result Date: 11/5/2021  EXAM: CT of the abdomen and pelvis INDICATION: Pain. COMPARISON: None. TECHNIQUE: Axial CT imaging of the abdomen and pelvis was performed with intravenous contrast. Multiplanar reformats were generated. One or more dose reduction techniques were used on this CT: automated exposure control, adjustment of the mAs and/or kVp according to patient size, and iterative reconstruction techniques.   The specific techniques used on this CT exam have been documented in the patient's electronic medical record. Digital Imaging and Communications in Medicine (DICOM) format image data are available to nonaffiliated external healthcare facilities or entities on a secure, media free, reciprocally searchable basis with patient authorization for at least a 12-month period after this study. _______________ FINDINGS: LOWER CHEST: Unremarkable. LIVER, BILIARY: Liver is normal. No biliary dilation. Gallbladder is unremarkable. PANCREAS: Normal. SPLEEN: Normal. ADRENALS: Normal. KIDNEYS/URETERS/BLADDER: Moderate to severe bilateral hydroureteronephrosis with bilateral urothelial thickening. Distended urinary bladder with multiple diverticula, likely sequela of chronic outlet obstruction. Associated mural thickening is concerning for superimposed infection. Gas within the bladder likely from recent manipulation. PELVIC ORGANS: Unremarkable. VASCULATURE: Scattered vascular calcifications. LYMPH NODES: No enlarged lymph nodes. GASTROINTESTINAL TRACT: No bowel dilation or wall thickening. Rectal fecal impaction. BONES: No acute or aggressive osseous abnormalities identified. T12 anterior wedging deformity. Prior right hip ORIF. OTHER: None. _______________     Thick-walled urinary bladder with associated stranding suggesting cystitis. Gas within the urinary bladder likely from recent manipulation. Bilateral moderate to severe hydroureteronephrosis with associated urothelial thickening suggesting superimposed infection. XR CHEST PORT    Result Date: 11/5/2021  EXAM: XR CHEST PORT CLINICAL INDICATION/HISTORY: meets SIRS criteria -Additional: Shortness of breath, respiratory distress with altered mental status COMPARISON: None TECHNIQUE: Frontal view of the chest _______________ FINDINGS: HEART AND MEDIASTINUM: Normal cardiac size and mediastinal contours. LUNGS AND PLEURAL SPACES: Lungs are mildly underexpanded. There is no focal pneumonic consolidation. No evidence of pneumothorax or pleural effusion.  BONY THORAX AND SOFT TISSUES: No acute osseous abnormality. Superposition of bowel loops over the upper abdomen noted. _______________     Mildly underexpanded lungs without superimposed acute radiographic abnormality. No results found for this or any previous visit. Please note that this dictation was completed with "[employer] the computer voice recognition software. Quite often unanticipated grammatical, syntax, homophones, and other interpretive errors are inadvertently transcribed by the computer software. Please disregard these errors. Please excuse any errors that have escaped final proofreading.      CC: Lotus, MD Heidy

## 2021-11-08 NOTE — PROGRESS NOTES
OT orders received, patient chart reviewed. Pt noted to be on comfort measures, and current plan is to d/c to the [facility] for comfort care. Will d/c Occupational Therapy orders at this time, please re-order therapy services if required. Thank you for this referral, will sign off.   Lali Ferris, OTR/L

## 2021-11-08 NOTE — ROUTINE PROCESS
Bedside and Verbal shift change report given to Jannet Lau RN (oncoming nurse) by Juancho Arnold RN (offgoing nurse). Report included the following information SBAR and Kardex.

## 2021-11-08 NOTE — PROGRESS NOTES
Cm met with pt and sister at bedside to discuss d/c planning, at this time sister would like to resume plan with returning back to [facility] of comfort measures, after conversation cm contacted The [address] [place] [address] [phone], facility will require Rapid covid and updated clinicals,pt is an LTC residence at facility, once clinicals are reviewed and covid results are back facility will inform CM when facility can accept pt, family would like facility to arrange hospice services.

## 2021-11-08 NOTE — PROGRESS NOTES
Transition of care to SNF: [facility]     Communication to Patient/Family:  Met with patient and sister Dawit Hartman, and they are agreeable to the transition plan. The Plan for Transition of Care is related to the following treatment goals: Comfort care    The Patient and/or patient representative  was provided with a choice of provider and agrees   with the discharge plan. Yes [x] No []    Freedom of choice list was provided with basic dialogue that supports the patient's individualized plan of care/goals and shares the quality data associated with the providers. Yes [x] No []    SNF/Rehab Transition:  Patient has been accepted to [address] [place] [address],[address] and meets criteria for admission. Patient will transported by medical transport and expected to leave today. Communication to SNF/Rehab:  Bedside RN, has been notified to update the transition plan to the facility and call report [phone]    Discharge information has been updated on the AVS and sent via [facility] and/or CC link. Discharge instructions to be fax'd to facility at [phone]     Please include all hard scripts for controlled substances, med rec and dc summary, and AVS in packet. Please medicate for pain prior to dc if possible and needed to help offset delay when patient first arrives to facility. Reviewed and confirmed with facility, [facility] , can manage the patient care needs for the following:     Rayshawn Neighbours with (X) only those applicable:  Medication:  [x]Medications are available at the facility  []IV Antibiotics    []Controlled Substance - hard copies available sent.   []Weekly Labs    Equipment:  []CPAP/BiPAP  []Wound Vacuum  []Dasilva or Urinary Device  []PICC/Central Line  []Nebulizer  []Ventilator    Treatment:  []Isolation (for MRSA, VRE, etc.)  []Surgical Drain Management  []Tracheostomy Care  []Dressing Changes  []Dialysis with transportation  []PEG Care  []Oxygen  []Daily Weights for Heart Failure    Dietary:  []Any diet limitations  []Tube Feedings   []Total Parenteral Management (TPN)    Financial Resources:  [x]Medicaid Application Completed    []UAI Completed and copy given to pt/family. []A screening has previously been completed. []Level II Completed    [] Private pay individual who will not become financially eligible for Medicaid within 6 months from admission to a [address] [place] [address] facility. [] Individual refused to have screening conducted. []Medicaid Application Completed    []The screening denied because it was determined individual did not need/did not qualify for nursing facility level of care. [] Out of state residents seeking direct admission to a [address] facility. [] Individuals who are inpatients of an out of state hospital, or in state or out of state veterans/ hospital and seek direct admission to a [address] facility  [] Individuals who are pateints or residents of a state owned/operated facility that is licensed by Department of [employer] and seek direct admission to [address] [place] [address]  [] A screening not required for enrollment in [facility] services as set out in [address] [place] [address] 30-  [] [facility] [place]) staff shall perform screenings of the [facility] clients. Advanced Care Plan:  []Surrogate Decision Maker of Care  []POA  []Communicated Code Status and copy sent.     Other:

## 2021-11-09 NOTE — PROGRESS NOTES
Report given to Kailash Middleton RN at [facility]

## 2021-11-11 LAB
BACTERIA SPEC CULT: NORMAL
BACTERIA SPEC CULT: NORMAL
SERVICE CMNT-IMP: NORMAL
SERVICE CMNT-IMP: NORMAL

## 2021-11-11 NOTE — PROGRESS NOTES
Physician Progress Note      Ananth Dorado  [facility] #:                  [id number]  :                       1950  ADMIT DATE:       2021 12:20 PM  [address] DATE:        2021 6:29 PM  RESPONDING  PROVIDER #:        Livia Farfan MD          QUERY TEXT:    Pt admitted with AMS and stroke. Pt noted to have \"Severe sepsis with acute organ dysfunction\" per ED provider. If possible, please document in the progress notes and discharge summary if you are evaluating and /or treating any of the following: The medical record reflects the following:    Risk Factors: UTI, YUNIOR, DM, Dementia, Resides at [facility]    Clinical Indicators:  > WBC 21 15.9, 21 15.6  > Sodium 21 130  > Creatinine 21 3.14, 21 2.57  > , 116, 107, 94, 96, 100, 102, 109, 99, 98  > RR in ED 36,  30  > EMS was told patient has a UTI and has been being treated with Bactrim at the nursing facility  > Per ED provider:  CONDITIONS ON ADMISSION: Sepsis is present at the time of admission  Sepsis protocol was initiated and coverage given for suspected UTI. UA confirms UTI.  > Afebrile    Treatment: Receiving Sepsis protocol, IV Rocephin, IV fluids    Thank you,  Warren Rucker, RN, BSN, [place], [address] [facility]  Options provided:  -- Severe sepsis with acute organ dysfunction, present on admission  -- UTI without Sepsis  -- Sepsis was ruled out  -- Other - I will add my own diagnosis  -- Disagree - Not applicable / Not valid  -- Disagree - Clinically unable to determine / Unknown  -- Refer to Clinical Documentation Reviewer    PROVIDER RESPONSE TEXT:    This patient has UTI without Sepsis. Query created by: Elizabet Salas on 2021 10:29 AM      QUERY TEXT:    Pt admitted with AMS with Left frontal stroke. H&P noted pt to have \"CVA-Likely episode 3 weeks ago. \" If possible, please document in progress notes and discharge summary if you are evaluating and/or treating any of the following: The medical record reflects the following:    Risk Factors: Dementia, UTI, advanced age, DM, HTN, Hyperlipidemia    Clinical Indicators:  > AMS  > Per H&P:  \"CVA-Likely episode 3 weeks ago\"  \"Sister reports that patient has been declining in her mental status for the past 3 weeks. \"  Per sister her mental status declined significantly for the past couple of days  > HPI 11/5- CT head showed patchy areas of transcortical low-attenuation involving the left frontal lobe and left basal ganglia concerning for sequela of infarct. > CT of the head 11/5/21- Patchy areas of transcortical low-attenuation involving the left frontal lobe and left basal ganglia as described concerning for sequela of infarct. Although age indeterminate in CT appearance, there is suggestion of slight accompanying edema which would suggest a more recent infarction. > Per ED provider note- \"Head CT was obtained which shows large likely acute infarct. \"    Treatment: Receiving CT of the head, Neurology consult, comfort care    Thank you,  Margot Son, RN, BSN, [place], [address]  Options provided:  -- Acute CVA  -- Past medical history of CVA  -- Other - I will add my own diagnosis  -- Disagree - Not applicable / Not valid  -- Disagree - Clinically unable to determine / Unknown  -- Refer to Clinical Documentation Reviewer    PROVIDER RESPONSE TEXT:    This patient has acute CVA.     Query created by: Kyler Bird on 11/8/2021 10:37 AM      Electronically signed by:  Tal Whitfield MD 11/10/2021 8:51 PM

## 2021-11-21 NOTE — PROGRESS NOTES
Back from X/R   Physician Progress Note      PATIENTClaudetta Gordon  CSN #:                  [id number]  :                       1950  ADMIT DATE:       2021 12:20 PM  [address] [place] DATE:        2021 6:29 PM  RESPONDING  PROVIDER #:        Tanner Hutchinson MD          QUERY TEXT:    Pt admitted with decline in mental status x3 weeks. Pt noted to have CVA as well as a UTI and hydronephrosis. If possible, please document in the progress notes and discharge summary the most likely etiology of patient's alteration in mental status: The medical record reflects the following:  Risk Factors: dementia, recent UTI on bactrim  Clinical Indicators: Presented with decline in mental status. UA WBC TNTC, large LE, negative nitrites   CT of the head:  concerning for sequela of infarct. Although age indeterminate in CT appearance, there is suggestion of slight accompanying edema which would suggest a more recent infarction.  CT abd: Thick-walled urinary bladder with associated stranding suggesting cystitis. Bilateral moderate to severe hydroureteronephrosis with associated urothelial thickening suggesting superimposed infection. Per ED provider note- Head CT was obtained which shows large likely acute infarct. Sister reports that patient has been declining in her mental status for the past 3 weeks. Impression: Acute cystitis w/ hematuria, acute stroke  Per H&P: CVA-Likely episode 3 weeks ago, UTI,  She has been lucid and responds to questions however 3 weeks ago she started having some confusion and decline in her status. She was checked for urine infection and was placed on Bactrim. Per sister her mental status declined significantly for the past couple of days.  Neuro consult: AMS in the setting of dementia, left frontal infarct, UTI with urinary retention. I explained to (sister) that she possibly has UTI and hydronephrosis due to urinary retention.     Treatment:  IV NS bolus, IV abx, CT of the head, CT of abd/pelvis, Neurology consult, comfort care    Nick Nix, RN, BSN, [address]  [phone]  Options provided:  -- AMS due to acute/subacute CVA  -- AMS due to UTI and hydronephrosis  -- AMS due to UTI, hydronephrosis and acute/subacute CVA  -- Other - I will add my own diagnosis  -- Disagree - Not applicable / Not valid  -- Disagree - Clinically unable to determine / Unknown  -- Refer to Clinical Documentation Reviewer    PROVIDER RESPONSE TEXT:    AMS due to acute/subacute CVA.     Query created by: Gurinder Bonilla on 11/17/2021 2:06 PM      Electronically signed by:  India Kauffman MD 11/21/2021 4:35 PM